# Patient Record
Sex: MALE | Race: WHITE | Employment: FULL TIME | URBAN - METROPOLITAN AREA
[De-identification: names, ages, dates, MRNs, and addresses within clinical notes are randomized per-mention and may not be internally consistent; named-entity substitution may affect disease eponyms.]

---

## 2021-02-11 ENCOUNTER — HOSPITAL ENCOUNTER (EMERGENCY)
Age: 46
Discharge: ANOTHER HOSPITAL | End: 2021-02-11
Attending: EMERGENCY MEDICINE
Payer: COMMERCIAL

## 2021-02-11 ENCOUNTER — APPOINTMENT (OUTPATIENT)
Dept: CT IMAGING | Age: 46
End: 2021-02-11
Payer: COMMERCIAL

## 2021-02-11 VITALS
WEIGHT: 155 LBS | RESPIRATION RATE: 16 BRPM | DIASTOLIC BLOOD PRESSURE: 87 MMHG | TEMPERATURE: 97.5 F | OXYGEN SATURATION: 98 % | BODY MASS INDEX: 23.49 KG/M2 | HEIGHT: 68 IN | SYSTOLIC BLOOD PRESSURE: 155 MMHG | HEART RATE: 71 BPM

## 2021-02-11 DIAGNOSIS — L03.211 FACIAL CELLULITIS: ICD-10-CM

## 2021-02-11 DIAGNOSIS — K05.219 ACUTE PERIODONTAL ABSCESS: Primary | ICD-10-CM

## 2021-02-11 LAB
ALBUMIN SERPL-MCNC: 4.9 GM/DL (ref 3.4–5)
ALP BLD-CCNC: 82 IU/L (ref 40–129)
ALT SERPL-CCNC: 14 U/L (ref 10–40)
ANION GAP SERPL CALCULATED.3IONS-SCNC: 10 MMOL/L (ref 4–16)
AST SERPL-CCNC: 16 IU/L (ref 15–37)
BASOPHILS ABSOLUTE: 0 K/CU MM
BASOPHILS RELATIVE PERCENT: 0.3 % (ref 0–1)
BILIRUB SERPL-MCNC: 0.3 MG/DL (ref 0–1)
BILIRUBIN DIRECT: 0.2 MG/DL (ref 0–0.3)
BILIRUBIN, INDIRECT: 0.1 MG/DL (ref 0–0.7)
BUN BLDV-MCNC: 9 MG/DL (ref 6–23)
CALCIUM SERPL-MCNC: 9.9 MG/DL (ref 8.3–10.6)
CHLORIDE BLD-SCNC: 101 MMOL/L (ref 99–110)
CO2: 27 MMOL/L (ref 21–32)
CREAT SERPL-MCNC: 0.8 MG/DL (ref 0.9–1.3)
DIFFERENTIAL TYPE: ABNORMAL
EOSINOPHILS ABSOLUTE: 0.2 K/CU MM
EOSINOPHILS RELATIVE PERCENT: 1.3 % (ref 0–3)
GFR AFRICAN AMERICAN: >60 ML/MIN/1.73M2
GFR NON-AFRICAN AMERICAN: >60 ML/MIN/1.73M2
GLUCOSE BLD-MCNC: 120 MG/DL (ref 70–99)
HCT VFR BLD CALC: 47.7 % (ref 42–52)
HEMOGLOBIN: 16.5 GM/DL (ref 13.5–18)
IMMATURE NEUTROPHIL %: 0.3 % (ref 0–0.43)
LACTATE: 1.2 MMOL/L (ref 0.4–2)
LYMPHOCYTES ABSOLUTE: 2.2 K/CU MM
LYMPHOCYTES RELATIVE PERCENT: 16.8 % (ref 24–44)
MCH RBC QN AUTO: 30.7 PG (ref 27–31)
MCHC RBC AUTO-ENTMCNC: 34.6 % (ref 32–36)
MCV RBC AUTO: 88.8 FL (ref 78–100)
MONOCYTES ABSOLUTE: 0.9 K/CU MM
MONOCYTES RELATIVE PERCENT: 6.7 % (ref 0–4)
PDW BLD-RTO: 12.1 % (ref 11.7–14.9)
PLATELET # BLD: 163 K/CU MM (ref 140–440)
PMV BLD AUTO: 11.3 FL (ref 7.5–11.1)
POTASSIUM SERPL-SCNC: 4 MMOL/L (ref 3.5–5.1)
RBC # BLD: 5.37 M/CU MM (ref 4.6–6.2)
SEGMENTED NEUTROPHILS ABSOLUTE COUNT: 10 K/CU MM
SEGMENTED NEUTROPHILS RELATIVE PERCENT: 74.6 % (ref 36–66)
SODIUM BLD-SCNC: 138 MMOL/L (ref 135–145)
TOTAL IMMATURE NEUTOROPHIL: 0.04 K/CU MM
TOTAL PROTEIN: 8.2 GM/DL (ref 6.4–8.2)
WBC # BLD: 13.3 K/CU MM (ref 4–10.5)

## 2021-02-11 PROCEDURE — 96365 THER/PROPH/DIAG IV INF INIT: CPT | Performed by: EMERGENCY MEDICINE

## 2021-02-11 PROCEDURE — 80053 COMPREHEN METABOLIC PANEL: CPT

## 2021-02-11 PROCEDURE — 85025 COMPLETE CBC W/AUTO DIFF WBC: CPT

## 2021-02-11 PROCEDURE — 6360000002 HC RX W HCPCS: Performed by: EMERGENCY MEDICINE

## 2021-02-11 PROCEDURE — 82248 BILIRUBIN DIRECT: CPT

## 2021-02-11 PROCEDURE — 96375 TX/PRO/DX INJ NEW DRUG ADDON: CPT | Performed by: EMERGENCY MEDICINE

## 2021-02-11 PROCEDURE — 83605 ASSAY OF LACTIC ACID: CPT

## 2021-02-11 PROCEDURE — 2580000003 HC RX 258: Performed by: EMERGENCY MEDICINE

## 2021-02-11 PROCEDURE — 6360000004 HC RX CONTRAST MEDICATION: Performed by: EMERGENCY MEDICINE

## 2021-02-11 PROCEDURE — 6370000000 HC RX 637 (ALT 250 FOR IP): Performed by: EMERGENCY MEDICINE

## 2021-02-11 PROCEDURE — 70487 CT MAXILLOFACIAL W/DYE: CPT

## 2021-02-11 PROCEDURE — 96367 TX/PROPH/DG ADDL SEQ IV INF: CPT | Performed by: EMERGENCY MEDICINE

## 2021-02-11 PROCEDURE — 99283 EMERGENCY DEPT VISIT LOW MDM: CPT | Performed by: EMERGENCY MEDICINE

## 2021-02-11 RX ORDER — DEXAMETHASONE SODIUM PHOSPHATE 4 MG/ML
10 INJECTION, SOLUTION INTRA-ARTICULAR; INTRALESIONAL; INTRAMUSCULAR; INTRAVENOUS; SOFT TISSUE ONCE
Status: COMPLETED | OUTPATIENT
Start: 2021-02-11 | End: 2021-02-11

## 2021-02-11 RX ORDER — NICOTINE 21 MG/24HR
1 PATCH, TRANSDERMAL 24 HOURS TRANSDERMAL DAILY
Status: DISCONTINUED | OUTPATIENT
Start: 2021-02-11 | End: 2021-02-11 | Stop reason: HOSPADM

## 2021-02-11 RX ORDER — SODIUM CHLORIDE 0.9 % (FLUSH) 0.9 %
10 SYRINGE (ML) INJECTION PRN
Status: DISCONTINUED | OUTPATIENT
Start: 2021-02-11 | End: 2021-02-11 | Stop reason: HOSPADM

## 2021-02-11 RX ORDER — ACETAMINOPHEN 500 MG
1000 TABLET ORAL EVERY 8 HOURS PRN
Status: DISCONTINUED | OUTPATIENT
Start: 2021-02-11 | End: 2021-02-11 | Stop reason: HOSPADM

## 2021-02-11 RX ORDER — SODIUM CHLORIDE 9 MG/ML
INJECTION, SOLUTION INTRAVENOUS CONTINUOUS
Status: DISCONTINUED | OUTPATIENT
Start: 2021-02-11 | End: 2021-02-11 | Stop reason: HOSPADM

## 2021-02-11 RX ORDER — FENTANYL CITRATE 50 UG/ML
50 INJECTION, SOLUTION INTRAMUSCULAR; INTRAVENOUS EVERY 4 HOURS PRN
Status: DISCONTINUED | OUTPATIENT
Start: 2021-02-11 | End: 2021-02-11 | Stop reason: HOSPADM

## 2021-02-11 RX ORDER — 0.9 % SODIUM CHLORIDE 0.9 %
1000 INTRAVENOUS SOLUTION INTRAVENOUS ONCE
Status: COMPLETED | OUTPATIENT
Start: 2021-02-11 | End: 2021-02-11

## 2021-02-11 RX ADMIN — VANCOMYCIN HYDROCHLORIDE 1000 MG: 1 INJECTION, POWDER, LYOPHILIZED, FOR SOLUTION INTRAVENOUS at 02:08

## 2021-02-11 RX ADMIN — SODIUM CHLORIDE 1000 ML: 9 INJECTION, SOLUTION INTRAVENOUS at 01:17

## 2021-02-11 RX ADMIN — IOPAMIDOL 75 ML: 755 INJECTION, SOLUTION INTRAVENOUS at 01:41

## 2021-02-11 RX ADMIN — CEFTRIAXONE 2000 MG: 1 INJECTION, POWDER, FOR SOLUTION INTRAMUSCULAR; INTRAVENOUS at 01:17

## 2021-02-11 RX ADMIN — SODIUM CHLORIDE, PRESERVATIVE FREE 10 ML: 5 INJECTION INTRAVENOUS at 01:42

## 2021-02-11 RX ADMIN — DEXAMETHASONE SODIUM PHOSPHATE 10 MG: 4 INJECTION INTRA-ARTICULAR; INTRALESIONAL; INTRAMUSCULAR; INTRAVENOUS; SOFT TISSUE at 01:17

## 2021-02-11 ASSESSMENT — PAIN DESCRIPTION - ONSET: ONSET: PROGRESSIVE

## 2021-02-11 ASSESSMENT — PAIN DESCRIPTION - PAIN TYPE: TYPE: ACUTE PAIN

## 2021-02-11 ASSESSMENT — PAIN DESCRIPTION - FREQUENCY: FREQUENCY: CONTINUOUS

## 2021-02-11 NOTE — ED PROVIDER NOTES
Emergency Department Encounter    Patient: Gurpreet Smart  MRN: [id number]  : 1975  Date of Evaluation: 2021  ED Provider:  Hermine Dancer    Triage Chief Complaint:   Facial Swelling (dental abscess causing facial swelling )    [place]:  Gurpreet Smart is a 50 y.o. male that presents with left facial swelling. Patient reports he woke up with swelling earlier today. No fever or chills. Patient reports he has poor dentition and is concerned for a dental abscess. Patient reports continuous throbbing pain 6 out of 10. Pain is exacerbated by chewing. Patient reports he is unaware of any alleviating factors. Pain radiates from his left upper jaw to his left cheek/infraorbital area. No tongue swelling, throat swelling, difficulty breathing, difficulty swallowing or difficulty opening his mouth. ROS - see HPI, below listed is current ROS at time of my eval:  General:  No fevers  Eyes:  no discharge  ENT:  No sore throat, no nasal congestion, no hearing changes, + dental pain  Cardiovascular:  No chest pain  Respiratory:  no cough  Gastrointestinal:  no nausea, no vomiting  Musculoskeletal:   no joint pain  Skin:  No rash  Neurologic:  No speech problems, no headache  Genitourinary:  No dysuria    Past Medical History:   Diagnosis Date    Broken bones     collar bone left sude and left hand    Bronchitis     Pneumonia     Smoker      History reviewed. No pertinent surgical history.   Family History   Problem Relation Age of Onset    High Blood Pressure Mother     Other Mother     Cancer Father     High Blood Pressure Father     Other Father     Cancer Maternal Grandmother      Social History     Socioeconomic History    Marital status: Single     Spouse name: Not on file    Number of children: Not on file    Years of education: Not on file    Highest education level: Not on file   Occupational History    Not on file   Social Needs    Financial resource strain: Not on file   Darlene-Kay insecurity     Worry: Not on file     Inability: Not on file    Transportation needs     Medical: Not on file     Non-medical: Not on file   Tobacco Use    Smoking status: Current Every Day Smoker     Packs/day: 1.00     Years: 23.00     Pack years: 23.00     Types: Cigarettes     Start date: 5/29/1991    Smokeless tobacco: Former User    Tobacco comment: chantix given   Substance and Sexual Activity    Alcohol use: No    Drug use: Yes     Types: Marijuana    Sexual activity: Not on file   Lifestyle    Physical activity     Days per week: Not on file     Minutes per session: Not on file    Stress: Not on file   Relationships    Social connections     Talks on phone: Not on file     Gets together: Not on file     Attends Religion service: Not on file     Active member of club or organization: Not on file     Attends meetings of clubs or organizations: Not on file     Relationship status: Not on file    Intimate partner violence     Fear of current or ex partner: Not on file     Emotionally abused: Not on file     Physically abused: Not on file     Forced sexual activity: Not on file   Other Topics Concern    Not on file   Social History Narrative    Not on file     Current Facility-Administered Medications   Medication Dose Route Frequency Provider Last Rate Last Admin    vancomycin 1000 mg IVPB in 250 mL D5W addavial  1,000 mg Intravenous Once Ofelia Trevino  mL/hr at 02/11/21 0208 1,000 mg at 02/11/21 0208    sodium chloride flush 0.9 % injection 10 mL  10 mL Intravenous PRN Ofelia Trevino MD   10 mL at 02/11/21 0142     Current Outpatient Medications   Medication Sig Dispense Refill    nicotine (NICODERM CQ) 21 MG/24HR Place 1 patch onto the skin every 24 hours 42 patch 0    albuterol (PROVENTIL HFA) 108 (90 BASE) MCG/ACT inhaler Inhale 2 puffs into the lungs every 4 hours as needed for Wheezing or Shortness of Breath With spacer (and mask if indicated). Thanks.  1 Inhaler 1    acetaminophen-codeine (TYLENOL/CODEINE #3) 300-30 MG per tablet Take 1 tablet by mouth every 4 hours as needed for Pain. 20 tablet 0    aspirin 81 MG tablet Take 81 mg by mouth daily. Allergies   Allergen Reactions    Clindamycin/Lincomycin     Penicillins Other (See Comments)     Unknown - allergy as child       Nursing Notes Reviewed    Physical Exam:  Triage VS:    ED Triage Vitals [02/11/21 0059]   Enc Vitals Group      BP (!) 159/92      Pulse 70      Resp 16      Temp 97.5 °F (36.4 °C)      Temp src       SpO2 99 %      Weight 155 lb (70.3 kg)      Height 5' 8\" (1.727 m)      Head Circumference       Peak Flow       Pain Score       Pain Loc       Pain Edu? Excl. in [address]? General appearance:  No acute distress. Skin:  Warm. Dry. Eye:  Extraocular movements intact. Ears, nose, mouth and throat:  Oral mucosa moist, no edema under the tongue, posterior pharynx without erythema, exudate or edema,diffuse gumline erythema with diffuse dental decay. Erythema and edema worse along the left upper dentition especially around his upper canine, no obvious abscess on visualization or palpation, diffuse dental caries and decay noted  Neck:  Trachea midline. No tender palpable cervical lymphadenopathy  Extremity:  Normal ROM     Respiratory:  Respirations nonlabored. Neurological:  Alert and oriented    Labs Reviewed   CBC WITH AUTO DIFFERENTIAL - Abnormal; Notable for the following components:       Result Value    WBC 13.3 (*)     MPV 11.3 (*)     Segs Relative 74.6 (*)     Lymphocytes % 16.8 (*)     Monocytes % 6.7 (*)     All other components within normal limits   BASIC METABOLIC PANEL W/ REFLEX TO MG FOR LOW K - Abnormal; Notable for the following components:    CREATININE 0.8 (*)     Glucose 120 (*)     All other components within normal limits   HEPATIC FUNCTION PANEL   LACTIC ACID, PLASMA         CT FACIAL BONES W CONTRAST   Final Result   1.  Extensive superior and inferior multifocal periodontal disease with   multifocal areas of cortical breakthrough. 2. Associated circumferential periodontal abscess at the external, posterior   and internal aspect of the left superior canine tooth. 3. Overlying diffuse left facial and anterior left neck diffuse soft tissue   cellulitis, as discussed above. MDM:  Patient presenting for dental pain and facial swelling which is most likely secondary to tooth decay/dental caries. Exam as above. No signs of Anamaria's angina. We will get CBC, CMP, lactic acid    Patient was given ceftriaxone 2 g IV x1, vancomycin 1 g IV x1, dexamethasone 10 mg IV x1. Laboratory also showed mild leukocytosis 13.3    CT shows extensive superior and inferior multifocal periodontal disease with multifocal areas of cortical breakthrough along with circumferential periodontal abscess at the external, posterior and internal aspects of the left superior canine. It also shows overlying diffuse left facial and anterior left neck cellulitis. I discussed the patient's work-up findings with him and great length as well as need for admission. On my exam I do not see an obvious drainable abscess In the emergency department. Patient will most likely need oral maxillofacial surgery evaluation. [facility] transfer center was contacted. I discussed the work-up, findings, need for admission with hospitalist at [facility] who was agreeable to admission. Clinical Impression:  1. Acute periodontal abscess    2. Facial cellulitis      Disposition referral (if applicable):  No follow-up provider specified.   Disposition medications (if applicable):  New Prescriptions    No medications on file     ED Provider Disposition Time  DISPOSITION Decision To Transfer 02/11/2021 02:35:57 AM      Comment: Please note this report has been produced using speech recognition software and may contain errors related to that system including errors in grammar,

## 2021-09-28 ENCOUNTER — OFFICE VISIT (OUTPATIENT)
Dept: FAMILY MEDICINE CLINIC | Age: 46
End: 2021-09-28
Payer: COMMERCIAL

## 2021-09-28 ENCOUNTER — HOSPITAL ENCOUNTER (OUTPATIENT)
Age: 46
Setting detail: SPECIMEN
Discharge: HOME | End: 2021-09-28
Payer: COMMERCIAL

## 2021-09-28 VITALS
TEMPERATURE: 98.1 F | HEART RATE: 60 BPM | OXYGEN SATURATION: 98 % | RESPIRATION RATE: 12 BRPM | HEIGHT: 68 IN | WEIGHT: 145 LBS | BODY MASS INDEX: 21.98 KG/M2

## 2021-09-28 DIAGNOSIS — J06.9 VIRAL URI WITH COUGH: Primary | ICD-10-CM

## 2021-09-28 DIAGNOSIS — Z20.822 CLOSE EXPOSURE TO COVID-19 VIRUS: ICD-10-CM

## 2021-09-28 PROCEDURE — 99213 OFFICE O/P EST LOW 20 MIN: CPT | Performed by: PHYSICIAN ASSISTANT

## 2021-09-28 PROCEDURE — U0003 INFECTIOUS AGENT DETECTION BY NUCLEIC ACID (DNA OR RNA); SEVERE ACUTE RESPIRATORY SYNDROME CORONAVIRUS 2 (SARS-COV-2) (CORONAVIRUS DISEASE [COVID-19]), AMPLIFIED PROBE TECHNIQUE, MAKING USE OF HIGH THROUGHPUT TECHNOLOGIES AS DESCRIBED BY CMS-2020-01-R: HCPCS

## 2021-09-28 PROCEDURE — U0005 INFEC AGEN DETEC AMPLI PROBE: HCPCS

## 2021-09-28 NOTE — PROGRESS NOTES
9/28/21  Una Caba  1975    FLU/COVID-19 CLINIC EVALUATION    HPI SYMPTOMS:    Michelet Breed    [] Fevers  [x] Chills  [x] Cough  [] Coughing up blood  [x] Chest Congestion  [x] Nasal Congestion  [x] Feeling short of breath  [] Sometimes  [x] Frequently  [] All the time  [x] Chest pain  [x] Headaches  [x]Tolerable  [] Severe  [x] Sore throat  [x] Muscle aches  [] Nausea  [] Vomiting  []Unable to keep fluids down  [x] Diarrhea  []Severe    [x] OTHER SYMPTOMS:    Fatigue  Numbness&joint pain    Symptom Duration:   [] 1  [] 2   [] 3   [] 4    [] 5   [] 6   [x] 7   [] 8   [] 9   [] 10   [] 11   [] 12   [] 13   [] 14   [] Longer than 14 days    Symptom course:   [] Worsening     [x] Stable     [] Improving    RISK FACTORS:    [] Pregnant or possibly pregnant  [] Age over 61  [] Diabetes  [] Heart disease  [] Asthma  [] COPD/Other chronic lung diseases  [] Active Cancer  [] On Chemotherapy  [] Taking oral steroids  [] History Lymphoma/Leukemia  [x] Close contact with a lab confirmed COVID-19 patient within 14 days of symptom onset-coworkers&in-law  [] History of travel from affected geographical areas within 14 days of symptom onset       VITALS:  There were no vitals filed for this visit. TESTS:    POCT FLU:  [] Positive     []Negative    ASSESSMENT:    [] Flu  [] Possible COVID-19  [] Strep    PLAN:    [] Discharge home with written instructions for:  [] Flu management  [] Possible COVID-19 infection with self-quarantine and management of symptoms  [] Follow-up with primary care physician or emergency department if worsens  [] Evaluation per physician/NP/PA in clinic  [] Sent to ER       An  electronic signature was used to authenticate this note.      --Yifan Thompson on 9/28/2021 at 9:51 AM

## 2021-09-28 NOTE — PROGRESS NOTES
9/28/2021    HPI:  Chief complaint and history of present illness as per medical assistant/nurse documented today in the Flu/COVID-19 clinic. Patient presents to the clinic today with 7-day history of chills, cough, chest and nasal congestion, intermittent headache, sore throat, myalgias. He states that he had been experiencing chest pain/tightness/heaviness and shortness of breath but that has resolved. He had also been experiencing diarrhea but that has also resolved. He denies hemoptysis, weight loss or weight gain, weakness, or fevers. He denies abdominal pain, nausea, vomiting, bloody or black stools, loss of taste or smell. He denies palpitations, lightheadedness, dizziness, vision changes. He has taken Aleve as needed. He has been in close contact with many ill individuals, quite a few who currently have COVID-19. He is employed by [employer] in [place] MEDICATIONS:  Prior to Visit Medications    Medication Sig Taking? Authorizing Provider   nicotine (NICODERM CQ) 21 MG/24HR Place 1 patch onto the skin every 24 hours  Radha Shaver MD   albuterol (PROVENTIL HFA) 108 (90 BASE) MCG/ACT inhaler Inhale 2 puffs into the lungs every 4 hours as needed for Wheezing or Shortness of Breath With spacer (and mask if indicated). Thanks. Tal Lopez DO   acetaminophen-codeine (TYLENOL/CODEINE #3) 300-30 MG per tablet Take 1 tablet by mouth every 4 hours as needed for Pain. Katherina Kocher, PA-C   aspirin 81 MG tablet Take 81 mg by mouth daily.   Historical Provider, MD       Allergies   Allergen Reactions    Clindamycin/Lincomycin     Penicillins Other (See Comments)     Unknown - allergy as child   ,   Past Medical History:   Diagnosis Date    Broken bones     collar bone left sude and left hand    Bronchitis     Pneumonia     Smoker        PHYSICAL EXAM:  Physical Exam  Temp:  98.1 F  Heart Rate:  60    Pulse Ox:  98%    Constitutional:  Well developed, well nourished  HENT: Normocephalic, atraumatic, bilateral external ears normal, bilateral ear canals normal, bilateral TMs normal, oropharynx moist, postnasal drip noted, uvula midline and benign and airway patent. Nasal mucosa congested. Eyes:  conjunctiva normal, no discharge, no scleral icterus  Cardiovascular:  Normal heart rate, normal rhythm, no murmurs, gallops or rubs  Thorax & Lungs:  Normal breath sounds, no respiratory distress, no wheezing, no rales, no rhonchi  LE: No erythema, edema, tenderness or palpable cord  Skin:  Warm, dry, no erythema, no rash  Neurologic:  Alert & oriented   Psychiatric:  Affect normal, mood normal    ASSESSMENT/PLAN:  1. Viral URI with cough  - Covid-19 Ambulatory    2. Close exposure to COVID-19 virus  - Covid-19 Ambulatory    Your COVID 19 test can take 1-5 days for the results to come back. We ask that you make a Mychart page and view your test results this way. You will need to Self quarantine until you know your results. Increase fluids and rest  Saline nasal spray as needed for nasal congestion  Warm salt gargles as needed for throat discomfort  Monitor temperature twice a day  Tylenol as needed for fevers and/or discomfort. Big deep breaths periodically throughout the day  Regular Mucinex over the counter as needed for chest congestion  Smoking cessation encouraged  If symptoms worsen -Go to the ER. Follow up with your primary care provider    FOLLOW-UP:  No follow-ups on file.     In addition to other information, the printed after visit summary provided to the patient includes:  [x] COVID-19 Self care instructions  [x] COVID-19 General patient information    Stephanie Bueno PA-C

## 2021-09-28 NOTE — PATIENT INSTRUCTIONS
Your COVID 19 test can take 1-5 days for the results to come back. We ask that you make a Mychart page and view your test results this way. You will need to Self quarantine until you know your results. Increase fluids and rest  Saline nasal spray as needed for nasal congestion  Warm salt gargles as needed for throat discomfort  Monitor temperature twice a day  Tylenol as needed for fevers and/or discomfort. Big deep breaths periodically throughout the day  Regular Mucinex over the counter as needed for chest congestion  If symptoms worsen -Go to the ER. Follow up with your primary care provider      To Whom it May Concern:    Maryann Shafer was tested for COVID-19 9/28/2021. He/she must stay home until test results are back. If test is positive, he/she must quarantine for a total of 10 days starting from day one of symptom onset. He/she must also be fever-free for 24 hours at that time, and also have improvement in symptoms. We do not recommend retesting as patients may continue to test positive for months even though no longer contagious. It is suggested you call [address] or  [place] with any questions regarding quarantine timeframe/return to work/school details.

## 2021-09-29 LAB
SARS-COV-2: NOT DETECTED
SOURCE: NORMAL

## 2022-02-10 ENCOUNTER — HOSPITAL ENCOUNTER (OUTPATIENT)
Age: 47
Setting detail: SPECIMEN
Discharge: HOME | End: 2022-02-10
Payer: COMMERCIAL

## 2022-02-10 ENCOUNTER — OFFICE VISIT (OUTPATIENT)
Dept: FAMILY MEDICINE CLINIC | Age: 47
End: 2022-02-10
Payer: COMMERCIAL

## 2022-02-10 VITALS
TEMPERATURE: 98.1 F | OXYGEN SATURATION: 97 % | WEIGHT: 159 LBS | HEART RATE: 94 BPM | RESPIRATION RATE: 12 BRPM | BODY MASS INDEX: 24.18 KG/M2

## 2022-02-10 DIAGNOSIS — J06.9 VIRAL URI WITH COUGH: Primary | ICD-10-CM

## 2022-02-10 PROCEDURE — U0003 INFECTIOUS AGENT DETECTION BY NUCLEIC ACID (DNA OR RNA); SEVERE ACUTE RESPIRATORY SYNDROME CORONAVIRUS 2 (SARS-COV-2) (CORONAVIRUS DISEASE [COVID-19]), AMPLIFIED PROBE TECHNIQUE, MAKING USE OF HIGH THROUGHPUT TECHNOLOGIES AS DESCRIBED BY CMS-2020-01-R: HCPCS

## 2022-02-10 PROCEDURE — 99213 OFFICE O/P EST LOW 20 MIN: CPT | Performed by: PHYSICIAN ASSISTANT

## 2022-02-10 PROCEDURE — U0005 INFEC AGEN DETEC AMPLI PROBE: HCPCS

## 2022-02-10 NOTE — PROGRESS NOTES
2/10/22  Ynr Roberson  1975    FLU/COVID-19 CLINIC EVALUATION    HPI SYMPTOMS:    Danika Veronika    [x] Fevers  [] Chills  [x] Cough  [] Coughing up blood  [x] Chest Congestion  [x] Nasal Congestion  [x] Feeling short of breath  [x] Sometimes  [] Frequently  [] All the time  [x] Chest pain  [x] Headaches  [x]Tolerable  [x] Severe  [x] Sore throat  [x] Muscle aches  [x] Nausea  [] Vomiting  []Unable to keep fluids down  [] Diarrhea  []Severe    [x] OTHER SYMPTOMS:    Fatigue    Symptom Duration:   [x] 1  [] 2   [] 3   [] 4    [] 5   [] 6   [] 7   [] 8   [] 9   [] 10   [] 11   [] 12   [] 13   [] 14   [] Longer than 14 days    Symptom course:   [] Worsening     [x] Stable     [] Improving    RISK FACTORS:    [] Pregnant or possibly pregnant  [] Age over 59  [] Diabetes  [] Heart disease  [] Asthma  [] COPD/Other chronic lung diseases  [] Active Cancer  [] On Chemotherapy  [] Taking oral steroids  [] History Lymphoma/Leukemia  [] Close contact with a lab confirmed COVID-19 patient within 14 days of symptom onset  [] History of travel from affected geographical areas within 14 days of symptom onset       VITALS:  There were no vitals filed for this visit. TESTS:    POCT FLU:  [] Positive     []Negative    ASSESSMENT:    [] Flu  [] Possible COVID-19  [] Strep    PLAN:    [] Discharge home with written instructions for:  [] Flu management  [] Possible COVID-19 infection with self-quarantine and management of symptoms  [] Follow-up with primary care physician or emergency department if worsens  [] Evaluation per physician/NP/PA in clinic  [] Sent to ER       An  electronic signature was used to authenticate this note.      --Belinda Garrison on 2/10/2022 at 6:14 PM

## 2022-02-10 NOTE — PROGRESS NOTES
2/10/2022    HPI:  Chief complaint and history of present illness as per medical assistant/nurse documented today in the Flu/COVID-19 clinic. Patient presents to the clinic today with 24-hour history of fever, cough, chest and nasal congestion, chest tightness, shortness of breath, headaches, sore throat, myalgia, nausea, fatigue. He states symptoms are overall mild and stable. He denies abdominal pain, vomiting, diarrhea, loss of taste or smell. He denies wheezing or hemoptysis. He has been taking Aleve as needed. He reports good p.o. intake and urine output. He is employed by [employer] [address]. Many ill coworkers. He is not vaccinated against COVID-19. MEDICATIONS:  Prior to Visit Medications    Medication Sig Taking? Authorizing Provider   nicotine (NICODERM CQ) 21 MG/24HR Place 1 patch onto the skin every 24 hours  Kumar Zayas MD   albuterol (PROVENTIL HFA) 108 (90 BASE) MCG/ACT inhaler Inhale 2 puffs into the lungs every 4 hours as needed for Wheezing or Shortness of Breath With spacer (and mask if indicated). Thanks. George Beltran,    acetaminophen-codeine (TYLENOL/CODEINE #3) 300-30 MG per tablet Take 1 tablet by mouth every 4 hours as needed for Pain. Angella Marcos PA-C   aspirin 81 MG tablet Take 81 mg by mouth daily.   Historical Provider, MD       Allergies   Allergen Reactions    Clindamycin/Lincomycin     Penicillins Other (See Comments)     Unknown - allergy as child   ,   Past Medical History:   Diagnosis Date    Broken bones     collar bone left sude and left hand    Bronchitis     Pneumonia     Smoker        PHYSICAL EXAM:  Physical Exam    Vitals:    02/10/22 1839   Pulse: 94   Resp: 12   Temp: 98.1 °F (36.7 °C)   SpO2: 97%         Constitutional:  Well developed, well nourished  HENT:  Normocephalic, atraumatic, bilateral external ears normal, bilateral ear canals normal, bilateral TMs normal, oropharynx moist, nose normal  Eyes:  conjunctiva normal, no discharge, no scleral icterus  Cardiovascular:  Normal heart rate, normal rhythm, no murmurs, gallops or rubs  Thorax & Lungs:  Normal breath sounds, no respiratory distress, no wheezing, no rales, no rhonchi  Skin:  Warm, dry, no erythema, no rash  Neurologic:  Alert & oriented   Psychiatric:  Affect normal, mood normal    ASSESSMENT/PLAN:  1. Viral URI with cough  - Covid-19 Ambulatory      COVID-19 test results pending  Offered Zofran, patient declined  Increase fluids and rest  [place] diet and advance as tolerated  Saline nasal spray as needed for nasal congestion  Warm salt gargles as needed for throat discomfort  Monitor temperature twice a day  Tylenol as needed for fevers and/or discomfort. Big deep breaths periodically throughout the day  Regular Mucinex over the counter as needed for chest congestion  Smoking cessation encouraged  If symptoms worsen -Go to the ER. Follow up with your primary care provider    I did don appropriate PPE (including N95 face mask, protective safety glasses, face shield, gloves, and gown) as recommended by the health facility/national standard best practice, during my interaction with the patient. FOLLOW-UP:  No follow-ups on file.       Raul Faye PA-C

## 2022-02-10 NOTE — PATIENT INSTRUCTIONS
Your COVID 19 test can take 1-5 days for the results to come back. We ask that you make a Mychart page and view your test results this way. You will need to Self quarantine until you know your results. If positive, please work on contact tracing. Increase fluids and rest  Duff diet and advance as tolerated  Saline nasal spray as needed for nasal congestion  Warm salt gargles as needed for throat discomfort  Monitor temperature twice a day  Tylenol as needed for fevers and/or discomfort. Big deep breaths periodically throughout the day  Regular Mucinex over the counter as needed for chest congestion  If symptoms worsen -Go to the ER. Follow up with your primary care provider      To Whom it May Concern:    Yoanna Blackwell was tested for COVID-19 2/10/2022. He/she must stay home until test results are back. If test is positive, isolate for a total of 5 days, starting from day 1 of symptom onset. After 5 days, if fever-free for 24 hours and there has been a gradual improvement in symptoms, may come out of isolation, but must consistently wear a mask when around other people for 5 additional days. (5 days isolation, 5 days mask-wearing). We do not recommend retesting as patients may continue to test positive for months even though no longer contagious. It is suggested you call [address] [facility] or [address] [place] [address] with any questions regarding isolation timeframe/return to work/school details. Jennifer Villalta PA-C      Patient Education        Viral Respiratory Infection: Care Instructions  Your Care Instructions     Viruses are very small organisms. They grow in number after they enter your body. There are many types that cause different illnesses, such as colds and the mumps. The symptoms of a viral respiratory infection often start quickly. They include a fever, sore throat, and runny nose. You may also just not feel well. Or you may not want to eat much.   Most viral respiratory infections are not serious. They usually get better with time and self-care. Antibiotics are not used to treat a viral infection. That's because antibiotics will not help cure a viral illness. In some cases, antiviral medicine can help your body fight a serious viral infection. Follow-up care is a key part of your treatment and safety. Be sure to make and go to all appointments, and call your doctor if you are having problems. It's also a good idea to know your test results and keep a list of the medicines you take. How can you care for yourself at home? · Rest as much as possible until you feel better. · Be safe with medicines. Take your medicine exactly as prescribed. Call your doctor if you think you are having a problem with your medicine. You will get more details on the specific medicine your doctor prescribes. · Take an over-the-counter pain medicine, such as acetaminophen (Tylenol), ibuprofen (Advil, Motrin), or naproxen (Aleve), as needed for pain and fever. Read and follow all instructions on the label. Do not give aspirin to anyone younger than 20. It has been linked to Reye syndrome, a serious illness. · Drink plenty of fluids. Hot fluids, such as tea or soup, may help relieve congestion in your nose and throat. If you have kidney, heart, or liver disease and have to limit fluids, talk with your doctor before you increase the amount of fluids you drink. · Try to clear mucus from your lungs by breathing deeply and coughing. · Gargle with warm salt water once an hour. This can help reduce swelling and throat pain. Use 1 teaspoon of salt mixed in 1 cup of warm water. · Do not smoke or allow others to smoke around you. If you need help quitting, talk to your doctor about stop-smoking programs and medicines. These can increase your chances of quitting for good. To avoid spreading the virus  · Cough or sneeze into a tissue. Then throw the tissue away.   · If you don't have a tissue, use your hand to cover your cough or sneeze. Then clean your hand. You can also cough into your sleeve. · Wash your hands often. Use soap and warm water. Wash for 15 to 20 seconds each time. · If you don't have soap and water near you, you can clean your hands with alcohol wipes or gel. When should you call for help? Call your doctor now or seek immediate medical care if:    · You have a new or higher fever.     · Your fever lasts more than 48 hours.     · You have trouble breathing.     · You have a fever with a stiff neck or a severe headache.     · You are sensitive to light.     · You feel very sleepy or confused. Watch closely for changes in your health, and be sure to contact your doctor if:    · You do not get better as expected. Where can you learn more? Go to https://[email or web address] org and sign in to your [employer] account. Enter A624 in the [employer] box to learn more about \"Viral Respiratory Infection: Care Instructions. \"     If you do not have an account, please click on the \"Sign Up Now\" link. Current as of: July 6, 2021               Content Version: 13.1  © 1912-5157 Healthwise, [email or web address]. Care instructions adapted under license by [facility] ([facility]). If you have questions about a medical condition or this instruction, always ask your healthcare professional. [address] any warranty or liability for your use of this information.

## 2022-02-11 LAB
SARS-COV-2: DETECTED
SOURCE: ABNORMAL

## 2022-05-06 ENCOUNTER — OFFICE VISIT (OUTPATIENT)
Dept: FAMILY MEDICINE CLINIC | Age: 47
End: 2022-05-06
Payer: COMMERCIAL

## 2022-05-06 VITALS
OXYGEN SATURATION: 97 % | SYSTOLIC BLOOD PRESSURE: 128 MMHG | TEMPERATURE: 99.1 F | WEIGHT: 163 LBS | BODY MASS INDEX: 24.78 KG/M2 | DIASTOLIC BLOOD PRESSURE: 82 MMHG | HEART RATE: 90 BPM

## 2022-05-06 DIAGNOSIS — R22.0 RIGHT FACIAL SWELLING: ICD-10-CM

## 2022-05-06 DIAGNOSIS — K08.89 PAIN, DENTAL: Primary | ICD-10-CM

## 2022-05-06 PROCEDURE — 99213 OFFICE O/P EST LOW 20 MIN: CPT | Performed by: PHYSICIAN ASSISTANT

## 2022-05-06 RX ORDER — METRONIDAZOLE 500 MG/1
500 TABLET ORAL 3 TIMES DAILY
Qty: 30 TABLET | Refills: 0 | Status: SHIPPED | OUTPATIENT
Start: 2022-05-06 | End: 2022-05-16

## 2022-05-06 RX ORDER — LEVOFLOXACIN 500 MG/1
500 TABLET, FILM COATED ORAL DAILY
Qty: 10 TABLET | Refills: 0 | Status: SHIPPED | OUTPATIENT
Start: 2022-05-06 | End: 2022-05-16

## 2022-05-06 NOTE — PROGRESS NOTES
5/6/2022    Gina Lazar    Chief Complaint   Patient presents with    Dental Pain       HPI  History was obtained from patient. Queen Kelley is a 48 y.o. male who presents today with concerns for right upper dental pain and right facial swelling. He states that he has \"terrible teeth and needs dental surgery but cannot afford it. \"  He woke up this morning with the right upper dental pain and facial swelling. Facial swelling continues to worsen. He denies skin redness. He denies foul taste in his mouth. He denies any visible dental abscesses. He denies nausea, vomiting, fever, chills, tachycardia. He had a periodontal abscess and facial cellulitis in February 2021 that required admission at [place].  He states that he had 3 teeth extracted at that time.     PAST MEDICAL HISTORY  Past Medical History:   Diagnosis Date    Broken bones     collar bone left sude and left hand    Bronchitis     Pneumonia     Smoker        FAMILY HISTORY  Family History   Problem Relation Age of Onset    High Blood Pressure Mother     Other Mother     Cancer Father     High Blood Pressure Father     Other Father     Cancer Maternal Grandmother        SOCIAL HISTORY  Social History     Socioeconomic History    Marital status: Single     Spouse name: None    Number of children: None    Years of education: None    Highest education level: None   Occupational History    None   Tobacco Use    Smoking status: Current Every Day Smoker     Packs/day: 1.00     Years: 23.00     Pack years: 23.00     Types: Cigarettes     Start date: 5/29/1991    Smokeless tobacco: Former User    Tobacco comment: chantix given   Vaping Use    Vaping Use: Never used   Substance and Sexual Activity    Alcohol use: No    Drug use: Not Currently     Types: Marijuana Chaimn Jose E)    Sexual activity: None   Other Topics Concern    None   Social History Narrative    None     Social Determinants of Health     Financial Resource Strain:     Difficulty of Paying Living Expenses: Not on file   Food Insecurity:     Worried About Running Out of Food in the Last Year: Not on file    Maci of Food in the Last Year: Not on file   Transportation Needs:     Lack of Transportation (Medical): Not on file    Lack of Transportation (Non-Medical): Not on file   Physical Activity:     Days of Exercise per Week: Not on file    Minutes of Exercise per Session: Not on file   Stress:     Feeling of Stress : Not on file   Social Connections:     Frequency of Communication with Friends and Family: Not on file    Frequency of Social Gatherings with Friends and Family: Not on file    Attends Jewish Services: Not on file    Active Member of [address] [place] [address] [employer] or Organizations: Not on file    Attends Club or Organization Meetings: Not on file    Marital Status: Not on file   Intimate Partner Violence:     Fear of Current or Ex-Partner: Not on file    Emotionally Abused: Not on file    Physically Abused: Not on file    Sexually Abused: Not on file   Housing Stability:     Unable to Pay for Housing in the Last Year: Not on file    Number of [place] in the Last Year: Not on file    Unstable Housing in the Last Year: Not on file        SURGICAL HISTORY  History reviewed. No pertinent surgical history. CURRENT MEDICATIONS  Current Outpatient Medications   Medication Sig Dispense Refill    levoFLOXacin (LEVAQUIN) 500 MG tablet Take 1 tablet by mouth daily for 10 days 10 tablet 0    metroNIDAZOLE (FLAGYL) 500 MG tablet Take 1 tablet by mouth 3 times daily for 10 days 30 tablet 0    nicotine (NICODERM CQ) 21 MG/24HR Place 1 patch onto the skin every 24 hours 42 patch 0    albuterol (PROVENTIL HFA) 108 (90 BASE) MCG/ACT inhaler Inhale 2 puffs into the lungs every 4 hours as needed for Wheezing or Shortness of Breath With spacer (and mask if indicated). Thanks.  1 Inhaler 1    acetaminophen-codeine (TYLENOL/CODEINE #3) 300-30 MG per tablet Take 1 tablet by mouth every 4 hours as needed for Pain. (Patient not taking: Reported on 5/6/2022) 20 tablet 0    aspirin 81 MG tablet Take 81 mg by mouth daily. (Patient not taking: Reported on 5/6/2022)       No current facility-administered medications for this visit. ALLERGIES  Allergies   Allergen Reactions    Clindamycin/Lincomycin     Penicillins Other (See Comments)     Unknown - allergy as child       PHYSICAL EXAM    /82   Pulse 90   Temp 99.1 °F (37.3 °C) (Oral)   Wt 163 lb (73.9 kg)   SpO2 97%   BMI 24.78 kg/m²     Constitutional:  Well developed, well nourished  HENT: Mild to moderate right sided facial edema. Patient reports no tenderness to palpation. There is no associated erythema. Bilateral external ears normal, bilateral ear canals and TMs normal.  Oropharynx moist.  Uvula midline and benign. Airway patent. Poor dental hygiene. Multiple decayed and cracked teeth. No visible dental abscess. No tenderness to palpation of the gumline with tongue blade. Eyes:  conjunctiva normal, no discharge, no scleral icterus  Neck:  No tenderness, supple  Lymphatic:  No lymphadenopathy noted  Cardiovascular:  Normal heart rate, normal rhythm, no murmurs, gallops or rubs  Thorax & Lungs:  Normal breath sounds, no respiratory distress, no wheezing, no rales, no rhonchi  Skin:  Warm, dry, no erythema, no rash  Neurologic:  Alert & oriented   Psychiatric:  Affect normal, mood normal    ASSESSMENT & PLAN    Niki Cole was seen today for dental pain. Diagnoses and all orders for this visit:    Pain, dental  -     levoFLOXacin (LEVAQUIN) 500 MG tablet; Take 1 tablet by mouth daily for 10 days  -     metroNIDAZOLE (FLAGYL) 500 MG tablet; Take 1 tablet by mouth 3 times daily for 10 days    Right facial swelling  -     levoFLOXacin (LEVAQUIN) 500 MG tablet; Take 1 tablet by mouth daily for 10 days  -     metroNIDAZOLE (FLAGYL) 500 MG tablet;  Take 1 tablet by mouth 3 times daily for 10 days       I highly encouraged patient to seek evaluation at the ED but he declined [address] He is hesitant due to finances. We discussed the risks of delaying care and patient voiced understanding. He has a penicillin and clindamycin allergy. We will initiate Levaquin and Flagyl. Warm salt water gargles encouraged. Smoking cessation encouraged. Again, highly stressed the need for ED evaluation. Patient states if the swelling has worsened in the morning, he will go. There are no discontinued medications. No follow-ups on file. Please note that this chart was generated using dragon dictation software. Although every effort was made to ensure the accuracy of this automated transcription, some errors in transcription may have occurred.     Electronically signed by Myriam Pickett PA-C on 5/6/2022

## 2022-05-11 ENCOUNTER — TELEPHONE (OUTPATIENT)
Dept: FAMILY MEDICINE CLINIC | Age: 47
End: 2022-05-11

## 2022-05-11 NOTE — TELEPHONE ENCOUNTER
Pt was seen in the clinic on 5/6/2022 and states his job makes him have to take off 7 days when missing work. Pt wants to know if he can get a return to work note dated for 5/12/2022 with a list of medications and a treatment plan. He also states it needs to state no restrictions when returning to work. Please advise.

## 2022-05-12 NOTE — TELEPHONE ENCOUNTER
Attempted to contact pt with no answer left vm stating work note is ready for pick and call back number for any questions or concerns.

## 2022-05-12 NOTE — TELEPHONE ENCOUNTER
Pt states he works at [address] [employer] in [place]. Pt states his pain and swelling is taken care of and that he did not seek treatment at ED.

## 2022-05-13 ENCOUNTER — OFFICE VISIT (OUTPATIENT)
Dept: FAMILY MEDICINE CLINIC | Age: 47
End: 2022-05-13
Payer: COMMERCIAL

## 2022-05-13 ENCOUNTER — TELEPHONE (OUTPATIENT)
Dept: FAMILY MEDICINE CLINIC | Age: 47
End: 2022-05-13

## 2022-05-13 VITALS
HEIGHT: 68 IN | TEMPERATURE: 97.7 F | OXYGEN SATURATION: 97 % | SYSTOLIC BLOOD PRESSURE: 122 MMHG | HEART RATE: 74 BPM | DIASTOLIC BLOOD PRESSURE: 74 MMHG | BODY MASS INDEX: 24.25 KG/M2 | WEIGHT: 160 LBS

## 2022-05-13 DIAGNOSIS — Z09 FOLLOW-UP EXAM: Primary | ICD-10-CM

## 2022-05-13 PROCEDURE — 99213 OFFICE O/P EST LOW 20 MIN: CPT | Performed by: PHYSICIAN ASSISTANT

## 2022-05-13 NOTE — TELEPHONE ENCOUNTER
Please call patient and let him know that I looked at his FMLA forms. It is stated on the forms that he needed treatment 2 or more times following a period of incapacity. I have only seen him once. He is welcome to schedule an appointment today and we will see if we can move forward with his forms.

## 2022-05-13 NOTE — PROGRESS NOTES
5/13/2022    [facility]    Chief Complaint   Patient presents with    Other     Follow-up       HPI  History was obtained from patient. Una Duarte is a 48 y.o. male who presents today with [facility] papers. Patient saw me in office on 5/6/2022 for a rather severe dental infection that caused right facial swelling and cellulitis. He was placed on 2 antibiotics for which he is still taking. He states that his pain and swelling has resolved. He missed a few days of work and is afraid his job is in jeopardy if [employer] are not completed. He has no complaints today. He knows that he knows he needs dental work, but states he cannot afford it right now. REVIEW OF SYMPTOMS    Constitutional:  Denies fever, chills  ENT: States dental pain has resolved. Denies facial swelling.   Denies sore throat or ear pain  Cardiovascular:  Denies chest pain, palpitations  Respiratory:  Denies cough or shortness of breath  GI:  Denies nausea, vomiting  Skin: Denies skin rashes  Neurologic:  Denies headache, focal weakness, or sensory changes  Lymphatic:  Denies swollen glands    PAST MEDICAL HISTORY  Past Medical History:   Diagnosis Date    Broken bones     collar bone left sude and left hand    Bronchitis     Pneumonia     Smoker        FAMILY HISTORY  Family History   Problem Relation Age of Onset    High Blood Pressure Mother     Other Mother     Cancer Father     High Blood Pressure Father     Other Father     Cancer Maternal Grandmother        SOCIAL HISTORY  Social History     Socioeconomic History    Marital status: Single     Spouse name: None    Number of children: None    Years of education: None    Highest education level: None   Occupational History    None   Tobacco Use    Smoking status: Current Every Day Smoker     Packs/day: 1.00     Years: 23.00     Pack years: 23.00     Types: Cigarettes     Start date: 5/29/1991    Smokeless tobacco: Former User    Tobacco comment: chantix given   Vaping Use    Vaping Use: Never used   Substance and Sexual Activity    Alcohol use: No    Drug use: Not Currently     Types: Marijuana Alfonso Radha)    Sexual activity: None   Other Topics Concern    None   Social History Narrative    None     Social Determinants of Health     Financial Resource Strain:     Difficulty of Paying Living Expenses: Not on file   Food Insecurity:     Worried About Running Out of Food in the Last Year: Not on file    Maci of Food in the Last Year: Not on file   Transportation Needs:     Lack of Transportation (Medical): Not on file    Lack of Transportation (Non-Medical): Not on file   Physical Activity:     Days of Exercise per Week: Not on file    Minutes of Exercise per Session: Not on file   Stress:     Feeling of Stress : Not on file   Social Connections:     Frequency of Communication with Friends and Family: Not on file    Frequency of Social Gatherings with Friends and Family: Not on file    Attends Adventism Services: Not on file    Active Member of [address] [place] [address] or Organizations: Not on file    Attends Club or Organization Meetings: Not on file    Marital Status: Not on file   Intimate Partner Violence:     Fear of Current or Ex-Partner: Not on file    Emotionally Abused: Not on file    Physically Abused: Not on file    Sexually Abused: Not on file   Housing Stability:     Unable to Pay for Housing in the Last Year: Not on file    Number of [place] in the Last Year: Not on file    Unstable Housing in the Last Year: Not on file        SURGICAL HISTORY  History reviewed. No pertinent surgical history.     CURRENT MEDICATIONS  Current Outpatient Medications   Medication Sig Dispense Refill    levoFLOXacin (LEVAQUIN) 500 MG tablet Take 1 tablet by mouth daily for 10 days 10 tablet 0    metroNIDAZOLE (FLAGYL) 500 MG tablet Take 1 tablet by mouth 3 times daily for 10 days 30 tablet 0    nicotine (NICODERM CQ) 21 MG/24HR Place 1 patch onto the skin every 24 hours 42 patch 0    albuterol (PROVENTIL HFA) 108 (90 BASE) MCG/ACT inhaler Inhale 2 puffs into the lungs every 4 hours as needed for Wheezing or Shortness of Breath With spacer (and mask if indicated). Thanks. 1 Inhaler 1    acetaminophen-codeine (TYLENOL/CODEINE #3) 300-30 MG per tablet Take 1 tablet by mouth every 4 hours as needed for Pain. (Patient not taking: Reported on 5/6/2022) 20 tablet 0    aspirin 81 MG tablet Take 81 mg by mouth daily. (Patient not taking: Reported on 5/6/2022)       No current facility-administered medications for this visit. ALLERGIES  Allergies   Allergen Reactions    Clindamycin/Lincomycin     Penicillins Other (See Comments)     Unknown - allergy as child       PHYSICAL EXAM    /74 (Site: Right Upper Arm, Position: Sitting, Cuff Size: Medium Adult)   Pulse 74   Temp 97.7 °F (36.5 °C) (Temporal)   Ht 5' 8\" (1.727 m)   Wt 160 lb (72.6 kg)   SpO2 97%   BMI 24.33 kg/m²     Constitutional:  Well developed, well nourished  HENT:  Normocephalic, atraumatic. No facial edema or erythema. Poor dental hygiene. Multiple cracked and decayed teeth. No signs of dental infection. Eyes:  conjunctiva normal, no discharge, no scleral icterus  Neck:  No tenderness, supple  Lymphatic:  No lymphadenopathy noted  Cardiovascular:  Normal heart rate, normal rhythm, no murmurs, gallops or rubs  Thorax & Lungs:  Normal breath sounds, no respiratory distress, no wheezing no rales, no rhonchi  Skin:  Warm, dry, no erythema, no rash  Neurologic:  Alert & oriented   Psychiatric:  Affect normal, mood normal    ASSESSMENT & PLAN    Garfield Ray was seen today for other. Diagnoses and all orders for this visit:    Follow-up exam       Dental infection, facial swelling, and facial cellulitis have resolved. Patient is cleared to return to work without restrictions. FMLA papers completed and given to patient. We will also fax to appropriate number.   Patient understands he should continue antibiotics until completion. Warm salt water gargles daily. Good dental hygiene encouraged. Smoking cessation encouraged. Follow-up with oral surgeon. There are no discontinued medications. No follow-ups on file. Please note that this chart was generated using dragon dictation software. Although every effort was made to ensure the accuracy of this automated transcription, some errors in transcription may have occurred.     Electronically signed by Tomás Vazquez PA-C on 5/13/2022

## 2022-09-13 ENCOUNTER — OFFICE VISIT (OUTPATIENT)
Dept: INTERNAL MEDICINE CLINIC | Age: 47
End: 2022-09-13
Payer: COMMERCIAL

## 2022-09-13 VITALS
TEMPERATURE: 97 F | BODY MASS INDEX: 23.67 KG/M2 | DIASTOLIC BLOOD PRESSURE: 62 MMHG | HEIGHT: 67 IN | WEIGHT: 150.8 LBS | RESPIRATION RATE: 12 BRPM | SYSTOLIC BLOOD PRESSURE: 100 MMHG | OXYGEN SATURATION: 99 % | HEART RATE: 46 BPM

## 2022-09-13 DIAGNOSIS — Z13.220 LIPID SCREENING: ICD-10-CM

## 2022-09-13 DIAGNOSIS — R00.1 BRADYCARDIA: ICD-10-CM

## 2022-09-13 DIAGNOSIS — R53.1 LEFT-SIDED WEAKNESS: Primary | ICD-10-CM

## 2022-09-13 PROCEDURE — 99203 OFFICE O/P NEW LOW 30 MIN: CPT | Performed by: INTERNAL MEDICINE

## 2022-09-13 ASSESSMENT — PATIENT HEALTH QUESTIONNAIRE - PHQ9
SUM OF ALL RESPONSES TO PHQ QUESTIONS 1-9: 0
SUM OF ALL RESPONSES TO PHQ9 QUESTIONS 1 & 2: 0
SUM OF ALL RESPONSES TO PHQ QUESTIONS 1-9: 0
1. LITTLE INTEREST OR PLEASURE IN DOING THINGS: 0
2. FEELING DOWN, DEPRESSED OR HOPELESS: 0
SUM OF ALL RESPONSES TO PHQ QUESTIONS 1-9: 0
SUM OF ALL RESPONSES TO PHQ QUESTIONS 1-9: 0

## 2022-09-13 NOTE — PROGRESS NOTES
Radha Meza  Patient's  is 1975  Seen in office on 2022      SUBJECTIVE:  Shanita alcantara 50 y. o.year old male presents today   Chief Complaint   Patient presents with    Established New Doctor    Other     Has been seen at [facility]  2022 and [facility] in [facility] with having TIA, CT of head and EKG is noted in chart from [facility] 9-3-2022     New patient  Patient states he went to [facility] in [place] on 2022  He was at work and felt short of breath,  lightheaded and dizzy and lowered himself to the floor. Estee called and and was taken to the hospital.  He was diagnosed probably TIA. Patient stated his symptoms were room was not spinning. Left hand tingling and numbness and some weakness. Sharp stabbing chest pain also  He had CT angiogram of head and neck. There was no occlusion noted. He had mild mucosal thickening noted along the right maxillary sinus. Indicating acute maxillary sinus and ethmoidal sinusitis. Chest x-ray was normal  CT scan of the head was normalExcept right maxillary sinusitis  His labs were also unremarkable  Neurologist was consulted in the ER. The plan was to admit him in the hospital but patient refused. He was discharged on aspirin. He has similar symptoms on 2022. This time he went to [facility]. He was at work again he had dizziness and some weakness. He was pale white at work and felt worse. Repeat CT scan of the head was negative. This time there was no mention of acute sinusitis either. No focal neurological deficit was noted. Since then patient is feeling well. Denies any weakness of the arms or legs. The dizziness has resolved. He was told to see a neurologist and PCP. Patient denies any headaches. No blurred vision or double vision. No slurred speech. No focal weakness now. He is ambulatory. Has not fallen any. Review of Systems   Constitutional: Negative. HENT: Negative. Eyes: Negative. Respiratory: Negative. Negative for cough, shortness of breath and wheezing. Cardiovascular:  Negative for chest pain, palpitations and leg swelling. Gastrointestinal: Negative. Endocrine: Negative. Genitourinary: Negative. Musculoskeletal: Negative. Skin: Negative. Allergic/Immunologic: Negative. Neurological: Negative. Psychiatric/Behavioral: Negative. OBJECTIVE: /62   Pulse (!) 46   Temp 97 °F (36.1 °C) (Temporal)   Resp 12   Ht 5' 7\" (1.702 m)   Wt 150 lb 12.8 oz (68.4 kg)   SpO2 99%   BMI 23.62 kg/m²     Wt Readings from Last 3 Encounters:   09/13/22 150 lb 12.8 oz (68.4 kg)   05/13/22 160 lb (72.6 kg)   05/06/22 163 lb (73.9 kg)      Patient was seen taking COVID-19 precautions. Face mask, gloves were used. Patient also wore facemask. GENERAL: - Alert, oriented, pleasant, in no apparent distress. HEENT: - Conjunctiva pink, no scleral icterus. ENT clear. NECK: -Supple. No jugular venous distention noted. No masses felt,  CARDIOVASCULAR: - Normal S1 and S2    PULMONARY: - No respiratory distress. No wheezes or rales. ABDOMEN: - Soft and non-tender,no masses  ororganomegaly. EXTREMITIES: - No cyanosis, clubbing, or significant edema. SKIN: Skin is warm and dry. NEUROLOGICAL: - Cranial nerves II through XII are grossly intact. No facial droop  Patient able to move all the extremities  Good strength in both upper and lower extremities  Gait is normal.    IMPRESSION:    Encounter Diagnoses   Name Primary? Left-sided weakness Yes    Bradycardia     Lipid screening        ASSESSMENT/PLAN:    Dizziness but at present has no dizziness  Smoker: Advised patient to quit smoking  Sinus infection. Repeat CT scan head showed no infection  Rule out TIA. Will refer patient to neurologist.  Continue aspirin  Will do lipid screening  Very complete work-up for TIA.   We will get a Holter monitor and an echocardiogram  Return to office in 2 weeks      Orders Placed This Encounter   Procedures    Lipid, Fasting    Antonella Duran MD, Neurology, [place]    Holter Monitor 24 Hour    Echocardiogram complete         Mediations reviewed with the patient. Continue current medications. Appropriate prescriptions are addressed. After visit summeryprovided. Follow up as directed sooner if needed. Questions answered and patient verbalizes understanding. Call for any problems, questions, or concerns. Allergies   Allergen Reactions    Clindamycin/Lincomycin     Penicillins Other (See Comments)     Unknown - allergy as child     Current Outpatient Medications   Medication Sig Dispense Refill    aspirin 81 MG tablet Take 81 mg by mouth daily      albuterol (PROVENTIL HFA) 108 (90 BASE) MCG/ACT inhaler Inhale 2 puffs into the lungs every 4 hours as needed for Wheezing or Shortness of Breath With spacer (and mask if indicated). Thanks. 1 Inhaler 1     No current facility-administered medications for this visit.      Past Medical History:   Diagnosis Date    Broken bones     collar bone left sude and left hand    Bronchitis     Pneumonia     Smoker      Past Surgical History:   Procedure Laterality Date    DENTAL SURGERY       Social History     Tobacco Use    Smoking status: Every Day     Packs/day: 1.00     Years: 23.00     Pack years: 23.00     Types: Cigarettes     Start date: 5/29/1991    Smokeless tobacco: Former    Tobacco comments:     chantix given   Substance Use Topics    Alcohol use: No       LAB REVIEW:  CBC:   Lab Results   Component Value Date/Time    WBC 13.3 02/11/2021 01:04 AM    HGB 16.5 02/11/2021 01:04 AM    HCT 47.7 02/11/2021 01:04 AM     02/11/2021 01:04 AM     Lipids: No results found for: HDL, LDLCALC, LDLDIRECT, TRIGLYCFAST, CHOLFAST  Renal:   Lab Results   Component Value Date/Time    BUN 9 02/11/2021 01:04 AM    CREATININE 0.8 02/11/2021 01:04 AM     02/11/2021 01:04 AM    K 4.0 02/11/2021 01:04 AM    ALT 14 02/11/2021 01:04 AM    AST 16 02/11/2021 01:04 AM    GLUCOSE 120 02/11/2021 01:04 AM    GLUF 77 10/20/2015 01:53 PM     PT/INR: No results found for: INR  A1C: No results found for: Evelia Gloria MD, 9/13/2022 , 11:35 AM

## 2022-09-13 NOTE — PROGRESS NOTES
Faxed referral to Dr Ted Aviles office his office request and the will call patient for an appt date and time. Fax completed.

## 2022-09-25 ASSESSMENT — ENCOUNTER SYMPTOMS
WHEEZING: 0
COUGH: 0
EYES NEGATIVE: 1
ALLERGIC/IMMUNOLOGIC NEGATIVE: 1
RESPIRATORY NEGATIVE: 1
GASTROINTESTINAL NEGATIVE: 1
SHORTNESS OF BREATH: 0

## 2022-09-28 ENCOUNTER — HOSPITAL ENCOUNTER (OUTPATIENT)
Age: 47
Discharge: HOME | End: 2022-09-28
Payer: COMMERCIAL

## 2022-09-28 LAB
CHOLESTEROL: 201 MG/DL
HDLC SERPL-MCNC: 42 MG/DL
LDL CHOLESTEROL CALCULATED: 139 MG/DL
TRIGL SERPL-MCNC: 100 MG/DL

## 2022-09-28 PROCEDURE — 36415 COLL VENOUS BLD VENIPUNCTURE: CPT

## 2022-09-28 PROCEDURE — 80061 LIPID PANEL: CPT

## 2022-09-30 ENCOUNTER — HOSPITAL ENCOUNTER (OUTPATIENT)
Dept: NON INVASIVE DIAGNOSTICS | Age: 47
Discharge: HOME | End: 2022-09-30
Payer: COMMERCIAL

## 2022-09-30 DIAGNOSIS — R53.1 LEFT-SIDED WEAKNESS: ICD-10-CM

## 2022-09-30 DIAGNOSIS — R00.1 BRADYCARDIA: ICD-10-CM

## 2022-09-30 DIAGNOSIS — R93.1 ABNORMAL ECHOCARDIOGRAM: ICD-10-CM

## 2022-09-30 LAB
LV EF: 50 %
LVEF MODALITY: NORMAL

## 2022-09-30 PROCEDURE — 93306 TTE W/DOPPLER COMPLETE: CPT

## 2022-09-30 PROCEDURE — 93225 XTRNL ECG REC<48 HRS REC: CPT

## 2022-09-30 PROCEDURE — 93226 XTRNL ECG REC<48 HR SCAN A/R: CPT

## 2022-09-30 PROCEDURE — 6360000004 HC RX CONTRAST MEDICATION

## 2022-09-30 RX ADMIN — PERFLUTREN 2.2 MG: 6.52 INJECTION, SUSPENSION INTRAVENOUS at 10:31

## 2022-10-06 PROBLEM — R93.1 ABNORMAL ECHOCARDIOGRAM: Status: ACTIVE | Noted: 2022-10-06

## 2022-10-07 ENCOUNTER — OFFICE VISIT (OUTPATIENT)
Dept: INTERNAL MEDICINE CLINIC | Age: 47
End: 2022-10-07
Payer: COMMERCIAL

## 2022-10-07 VITALS
TEMPERATURE: 97 F | OXYGEN SATURATION: 99 % | SYSTOLIC BLOOD PRESSURE: 112 MMHG | BODY MASS INDEX: 24.31 KG/M2 | RESPIRATION RATE: 12 BRPM | HEART RATE: 56 BPM | DIASTOLIC BLOOD PRESSURE: 62 MMHG | WEIGHT: 155.2 LBS

## 2022-10-07 DIAGNOSIS — R42 DIZZINESS: ICD-10-CM

## 2022-10-07 DIAGNOSIS — R93.1 ABNORMAL ECHOCARDIOGRAM: Primary | ICD-10-CM

## 2022-10-07 DIAGNOSIS — E78.5 DYSLIPIDEMIA: ICD-10-CM

## 2022-10-07 PROCEDURE — 99213 OFFICE O/P EST LOW 20 MIN: CPT | Performed by: INTERNAL MEDICINE

## 2022-10-07 RX ORDER — ATORVASTATIN CALCIUM 20 MG/1
20 TABLET, FILM COATED ORAL DAILY
Qty: 30 TABLET | Refills: 5 | Status: SHIPPED | OUTPATIENT
Start: 2022-10-07

## 2022-10-07 ASSESSMENT — PATIENT HEALTH QUESTIONNAIRE - PHQ9
SUM OF ALL RESPONSES TO PHQ QUESTIONS 1-9: 0
SUM OF ALL RESPONSES TO PHQ9 QUESTIONS 1 & 2: 0
SUM OF ALL RESPONSES TO PHQ QUESTIONS 1-9: 0
2. FEELING DOWN, DEPRESSED OR HOPELESS: 0
1. LITTLE INTEREST OR PLEASURE IN DOING THINGS: 0

## 2022-10-07 NOTE — PROGRESS NOTES
Sharon Lowry  Patient's  is 1975  Seen in office on 10/7/2022      SUBJECTIVE:  Noel Bailon maricruz 50 y. o.year old male presents today   Chief Complaint   Patient presents with    Follow-up    Other     1)Has appt in Dec to see Dr Patric Yost  2)had echo last week     Patient has a history of dizziness left-sided weakness and there was question of TIA. Work-up was done at that time. CT scan of the brain was negative. Pt has dizziness off and on     He wants  intermettant leave at work  He had 2 episode at work and could not work and had to go ER as mentioned previously  He still has dizziness off and on. Looks like TIA episode  On ASA    Further w/u showed   Echo : Showed PFO versus atrial septal defect      Review of Systems    OBJECTIVE: /62   Pulse 56   Temp 97 °F (36.1 °C) (Temporal)   Resp 12   Wt 155 lb 3.2 oz (70.4 kg)   SpO2 99%   BMI 24.31 kg/m²     Wt Readings from Last 3 Encounters:   10/07/22 155 lb 3.2 oz (70.4 kg)   22 150 lb 12.8 oz (68.4 kg)   22 160 lb (72.6 kg)      GENERAL: - Alert, oriented, pleasant, in no apparent distress. HEENT: - Conjunctiva pink, no scleral icterus. ENT clear. NECK: -Supple. No jugular venous distention noted. No masses felt,  CARDIOVASCULAR: - Normal S1 and S2    PULMONARY: - No respiratory distress. No wheezes or rales. ABDOMEN: - Soft and non-tender,no masses  ororganomegaly. EXTREMITIES: - No cyanosis, clubbing, or significant edema. SKIN: Skin is warm and dry. NEUROLOGICAL: - Cranial nerves II through XII are grossly intact. Echo : 22   Summary   Left ventricular systolic function is normal.   Ejection fraction is visually estimated at 50%. Aneurysmal inter-atrial septum; positive bubble study suggestive of PFO or   ASD. (see image 104). No significant valvular disease noted. suggest seamus if indicated   No evidence of any pericardial effusion.       Signature ------------------------------------------------------------------   Electronically signed by Clarisa Roberts MD   (Interpreting physician) on 09/30/2022 at 09:34 PM   ------------------------------------------------------------------    IMPRESSION:    Encounter Diagnoses   Name Primary? Abnormal echocardiogram Yes    Dizziness     Dyslipidemia        ASSESSMENT/PLAN:    Dizziness could be TIA. Continue aspirin 81 mg daily. Patient was referred to neurologist and he has an appointment next month. PFO. Patient is referred to cardiologist : Holter  results pending . Smoker: Advised patient to quit smoking. Dysplipidemia. Patient was started on atorvastatin 20 mg daily    Patient states he is unable to work because of dizziness off and on. Patient will see cardiologist and if the work-up is negative we will release him back to work. Orders Placed This Encounter   Procedures    Comprehensive Metabolic Panel     Orders Placed This Encounter   Medications    atorvastatin (LIPITOR) 20 MG tablet     Sig: Take 1 tablet by mouth daily     Dispense:  30 tablet     Refill:  5           Mediations reviewed with the patient. Continue current medications. Appropriate prescriptions are addressed. After visit summeryprovided. Follow up as directed sooner if needed. Questions answered and patient verbalizes understanding. Call for any problems, questions, or concerns. Allergies   Allergen Reactions    Clindamycin/Lincomycin     Penicillins Other (See Comments)     Unknown - allergy as child     Current Outpatient Medications   Medication Sig Dispense Refill    aspirin 81 MG tablet Take 81 mg by mouth daily      albuterol (PROVENTIL HFA) 108 (90 BASE) MCG/ACT inhaler Inhale 2 puffs into the lungs every 4 hours as needed for Wheezing or Shortness of Breath With spacer (and mask if indicated). Thanks.  (Patient not taking: Reported on 10/7/2022) 1 Inhaler 1     No current facility-administered medications for this visit.      Past Medical History:   Diagnosis Date    Broken bones     collar bone left sude and left hand    Bronchitis     Pneumonia     Smoker      Past Surgical History:   Procedure Laterality Date    DENTAL SURGERY       Social History     Tobacco Use    Smoking status: Every Day     Packs/day: 1.00     Years: 23.00     Pack years: 23.00     Types: Cigarettes     Start date: 5/29/1991    Smokeless tobacco: Former    Tobacco comments:     chantix given   Substance Use Topics    Alcohol use: No       LAB REVIEW:  CBC:   Lab Results   Component Value Date/Time    WBC 13.3 02/11/2021 01:04 AM    HGB 16.5 02/11/2021 01:04 AM    HCT 47.7 02/11/2021 01:04 AM     02/11/2021 01:04 AM     Lipids:   Lab Results   Component Value Date    HDL 42 09/28/2022    LDLCALC 139 (H) 09/28/2022     Renal:   Lab Results   Component Value Date/Time    BUN 9 02/11/2021 01:04 AM    CREATININE 0.8 02/11/2021 01:04 AM     02/11/2021 01:04 AM    K 4.0 02/11/2021 01:04 AM    ALT 14 02/11/2021 01:04 AM    AST 16 02/11/2021 01:04 AM    GLUCOSE 120 02/11/2021 01:04 AM    GLUF 77 10/20/2015 01:53 PM     PT/INR: No results found for: INR  A1C: No results found for: Lobito Tabor MD, 10/7/2022 , 9:44 AM The patient is a 15y Male complaining of syncope.

## 2022-10-07 NOTE — PROGRESS NOTES
Lab draw, 1 sst 22g needle right a/c pressure applied to site, pt on ASA.  No bleeding notied after 5 minutes

## 2022-10-08 LAB
A/G RATIO: 2.1 (ref 1.1–2.2)
ALBUMIN SERPL-MCNC: 4.8 G/DL (ref 3.4–5)
ALP BLD-CCNC: 68 U/L (ref 40–129)
ALT SERPL-CCNC: 18 U/L (ref 10–40)
ANION GAP SERPL CALCULATED.3IONS-SCNC: 11 MMOL/L (ref 3–16)
AST SERPL-CCNC: 18 U/L (ref 15–37)
BILIRUB SERPL-MCNC: <0.2 MG/DL (ref 0–1)
BUN BLDV-MCNC: 6 MG/DL (ref 7–20)
CALCIUM SERPL-MCNC: 9.5 MG/DL (ref 8.3–10.6)
CHLORIDE BLD-SCNC: 104 MMOL/L (ref 99–110)
CO2: 27 MMOL/L (ref 21–32)
CREAT SERPL-MCNC: 0.8 MG/DL (ref 0.9–1.3)
GFR AFRICAN AMERICAN: >60
GFR NON-AFRICAN AMERICAN: >60
GLUCOSE BLD-MCNC: 87 MG/DL (ref 70–99)
POTASSIUM SERPL-SCNC: 4.1 MMOL/L (ref 3.5–5.1)
SODIUM BLD-SCNC: 142 MMOL/L (ref 136–145)
TOTAL PROTEIN: 7.1 G/DL (ref 6.4–8.2)

## 2022-10-10 ENCOUNTER — NURSE ONLY (OUTPATIENT)
Dept: CARDIOLOGY CLINIC | Age: 47
End: 2022-10-10

## 2022-10-10 ENCOUNTER — OFFICE VISIT (OUTPATIENT)
Dept: CARDIOLOGY CLINIC | Age: 47
End: 2022-10-10
Payer: COMMERCIAL

## 2022-10-10 ENCOUNTER — TELEPHONE (OUTPATIENT)
Dept: INTERNAL MEDICINE CLINIC | Age: 47
End: 2022-10-10

## 2022-10-10 ENCOUNTER — HOSPITAL ENCOUNTER (OUTPATIENT)
Age: 47
Setting detail: SPECIMEN
Discharge: HOME | End: 2022-10-10
Payer: COMMERCIAL

## 2022-10-10 VITALS
WEIGHT: 155.8 LBS | HEART RATE: 58 BPM | SYSTOLIC BLOOD PRESSURE: 110 MMHG | DIASTOLIC BLOOD PRESSURE: 80 MMHG | BODY MASS INDEX: 24.45 KG/M2 | HEIGHT: 67 IN

## 2022-10-10 VITALS — DIASTOLIC BLOOD PRESSURE: 70 MMHG | TEMPERATURE: 97.7 F | SYSTOLIC BLOOD PRESSURE: 110 MMHG

## 2022-10-10 DIAGNOSIS — G45.9 TIA (TRANSIENT ISCHEMIC ATTACK): Primary | ICD-10-CM

## 2022-10-10 DIAGNOSIS — M79.89 LEG SWELLING: ICD-10-CM

## 2022-10-10 DIAGNOSIS — R07.89 OTHER CHEST PAIN: ICD-10-CM

## 2022-10-10 DIAGNOSIS — Z01.810 PRE-OPERATIVE CARDIOVASCULAR EXAMINATION: Primary | ICD-10-CM

## 2022-10-10 PROCEDURE — U0005 INFEC AGEN DETEC AMPLI PROBE: HCPCS

## 2022-10-10 PROCEDURE — U0003 INFECTIOUS AGENT DETECTION BY NUCLEIC ACID (DNA OR RNA); SEVERE ACUTE RESPIRATORY SYNDROME CORONAVIRUS 2 (SARS-COV-2) (CORONAVIRUS DISEASE [COVID-19]), AMPLIFIED PROBE TECHNIQUE, MAKING USE OF HIGH THROUGHPUT TECHNOLOGIES AS DESCRIBED BY CMS-2020-01-R: HCPCS

## 2022-10-10 PROCEDURE — 99204 OFFICE O/P NEW MOD 45 MIN: CPT | Performed by: INTERNAL MEDICINE

## 2022-10-10 NOTE — PROGRESS NOTES
CARDIOLOGY CONSULT NOTE   Reason for consultation:  CHEST PAIN, SOb, TIA    Referring physician: Jesse Carbajal MD    Primary care physician: Jesse Carbajal MD      Dear Jesse Carbajal MD  Thanks for the consult. History of present illness:Reji is a 50 y. o.year old who  presents with   chest pain for last few years, happening daily, intermittent for 15 to 20 mins and aggravated with activity substernal also,reproducible with palpation, radiated to shoulder, 6/10, tender to touch,associated with shortness of breath, + sweating, nausea, did not get NTG in ED and subsided with antiinflammatory injection. F ew months ago, he had TIA, he was at work and had dizziness, left sided weakness and his mouth had drooling and had neurologist evaluation via video link, and few weeks after, he had similar episodes and went to [facility] ED and diagnosed with bradycardia, his heart was in 46-49  Her had echo which showed possible PFO/ASD with inter atrial septum aneurysm. he has some right leg swelling and pain with burning,  Had 24hr  holter monitor and resuilts are not back  Chief Complaint   Patient presents with    New Patient     Pt states he occ has a dull ache in his chest and sometimes he has a sharp pain. Pt states he occ gets dizzy and has some SOB on occasion. Works a very physically intensive job- was working when the mini stroke happened. Patient has not started lipitor yet    Chest Pain    Shortness of Breath    Dizziness     Blood pressure, cholesterol, blood glucose and weight are well controlled. Past medical history:    has a past medical history of Broken bones, Bronchitis, Pneumonia, and Smoker. Past surgical history:   has a past surgical history that includes Dental surgery. Social History:   reports that he has been smoking cigarettes. He started smoking about 31 years ago. He has a 23.00 pack-year smoking history.  He has quit using smokeless tobacco. He reports that he does not currently use drugs after having used the following drugs: Marijuana Shellye Burkitt). He reports that he does not drink alcohol. Family history:   no family history of CAD, STROKE of DM    Allergies   Allergen Reactions    Clindamycin/Lincomycin     Penicillins Other (See Comments)     Unknown - allergy as child       No current facility-administered medications for this visit. Current Outpatient Medications   Medication Sig Dispense Refill    aspirin 81 MG tablet Take 81 mg by mouth daily      atorvastatin (LIPITOR) 20 MG tablet Take 1 tablet by mouth daily (Patient not taking: Reported on 10/10/2022) 30 tablet 5     No current facility-administered medications for this visit. Review of Systems:   Constitutional: No Fever or Weight Loss   Eyes: No Decreased Vision  ENT: No Headaches, Hearing Loss or Vertigo  Cardiovascular: No chest pain, dyspnea on exertion, palpitations or loss of consciousness  Respiratory: No cough or wheezing    Gastrointestinal: No abdominal pain, appetite loss, blood in stools, constipation, diarrhea or heartburn  Genitourinary: No dysuria, trouble voiding, or hematuria  Musculoskeletal:  No gait disturbance, weakness or joint complaints  Integumentary: No rash or pruritis  Neurological: No TIA or stroke symptoms  Psychiatric: No anxiety or depression  Endocrine: No malaise, fatigue or temperature intolerance  Hematologic/Lymphatic: No bleeding problems, blood clots or swollen lymph nodes  Allergic/Immunologic: No nasal congestion or hives  All systems negative except as marked. Physical Examination:    Vitals:    10/10/22 0955   BP: 110/80   Pulse: 58    Rr 14  afebrile  Wt Readings from Last 3 Encounters:   10/10/22 155 lb 12.8 oz (70.7 kg)   10/07/22 155 lb 3.2 oz (70.4 kg)   09/13/22 150 lb 12.8 oz (68.4 kg)     Body mass index is 24.4 kg/m². General Appearance:  No distress, conversant    Constitutional:  Well developed, Well nourished, No acute distress, Non-toxic appearance.    HENT: Normocephalic, Atraumatic, Bilateral external ears normal, Oropharynx moist, No oral exudates, Nose normal. Neck- Normal range of motion, No tenderness, Supple, No stridor,no apical-carotid delay, no carotid bruit  Eyes:  PERRL, EOMI, Conjunctiva normal, No discharge. Respiratory:  Normal breath sounds, No respiratory distress, No wheezing, No chest tenderness. ,no use of accessory muscles, diaphragm movement is normal  Cardiovascular: (PMI) apex non displaced,no lifts no thrills, no s3,no s4, Normal heart rate, Normal rhythm, No murmurs, No rubs, No gallops. Carotid arteries pulse and amplitude are normal no bruit, no abdominal bruit noted ( normal abdominal aorta ausculation), femoral arteries pulse and amplitude are normal no bruit, pedal pulses are normal  GI:  Bowel sounds normal, Soft, No tenderness, No masses, No pulsatile masses, no hepatosplenomegally, no bruits  : External genitalia appear normal, No masses or lesions. No discharge. No CVA tenderness. Musculoskeletal:  Intact distal pulses, No edema, No tenderness, No cyanosis, No clubbing. Good range of motion in all major joints. No tenderness to palpation or major deformities noted. Back- No tenderness. Integument:  Warm, Dry, No erythema, No rash. Skin: no rash, no ulcers  Lymphatic:  No lymphadenopathy noted. Neurologic:  Alert & oriented x 3, Normal motor function, Normal sensory function, No focal deficits noted. Psychiatric:  Affect normal, Judgment normal, Mood normal.   Lab Review   No results for input(s): WBC, HGB, HCT, PLT in the last 72 hours. Recent Labs     10/07/22  1013      K 4.1      CO2 27   BUN 6*   CREATININE 0.8*     Recent Labs     10/07/22  1013   AST 18   ALT 18   BILITOT <0.2   ALKPHOS 68     No results for input(s): TROPONINI in the last 72 hours.   No results found for: BNP  No results found for: INR, PROTIME      EKG: nsr ( report from [facility]_)    Chest Xray:    ECHO:nLeft ventricular systolic function is normal.   Ejection fraction is visually estimated at 50%. Aneurysmal inter-atrial septum; positive bubble study suggestive of PFO or   ASD. (see image 104). Labs, echo, meds reviewed  Assessment: 50 y. o.year old with PMH of  has a past medical history of Broken bones, Bronchitis, Pneumonia, and Smoker. Recommendations:    CHEST PAIN: STABLE, STRESS TEST ORDERED  ASD/PFO, INTERATERIAL SEPTUM\", ALEXUS ordered, procedure of PFO closure explained  TIA: continue aspirin, start statins, CTA neck was normal, recommend to stop smoking and marijuana, will call [employer] monitor [employer] to upload the holter reading  Leg pain and swellling\"; r/o DVT since he has PFO and TIA, will need to r/o DVT, venous doppler ordere d  Health maintenance: exerise and diet  All labs, medications and tests reviewed, continue all other medications of all above medical condition listed as is.          Karal Hall MD, 10/10/2022 10:03 AM

## 2022-10-10 NOTE — PROGRESS NOTES
Performed COVID testing with oral swab for 3 seconds to throat. Per This RN. Dropped swab in labeled collection tube along with  lab order and facesheet with copy of insurance card placed in collection bag. Bag placed in biohazard bag and placed in fridge.  called for . Patient was here in office & educated on ALEXUS for Dx: PFO. Procedure is scheduled for 10/14/22 @ 10:00, w/arrival @ 8:30, @ [address] [place] [address]. Procedure and risks were explained to patient. Consent forms were signed. Instructions were given to patient to remain NPO after midnight the night before procedure. Patient may take morning meds the morning of procedure with small amount of water. Patient is asked to call hospital @ [phone], 1 to 2 days before procedure to pre-register. Patient was notified that procedure could be delayed due to an emergency. Patient voiced understanding.  Copies of consent, pre-testing orders, & instructions scanned into media

## 2022-10-11 LAB
SARS-COV-2: NOT DETECTED
SOURCE: NORMAL

## 2022-10-12 LAB
ACQUISITION DURATION: NORMAL S
AVERAGE HEART RATE: 64 BPM
EKG DIAGNOSIS: NORMAL
HOOKUP DATE: NORMAL
HOOKUP TIME: NORMAL
MAX HEART RATE TIME/DATE: NORMAL
MAX HEART RATE: 102 BPM
MIN HEART RATE TIME/DATE: NORMAL
MIN HEART RATE: 47 BPM
NUMBER OF QRS COMPLEXES: NORMAL
NUMBER OF SUPRAVENTRICULAR COUPLETS: 0
NUMBER OF SUPRAVENTRICULAR ECTOPICS: 65
NUMBER OF SUPRAVENTRICULAR ISOLATED BEATS: 51
NUMBER OF VENTRICULAR BIGEMINAL CYCLES: 0
NUMBER OF VENTRICULAR COUPLETS: 0
NUMBER OF VENTRICULAR ECTOPICS: 38

## 2022-10-13 ENCOUNTER — TELEPHONE (OUTPATIENT)
Dept: CARDIOLOGY CLINIC | Age: 47
End: 2022-10-13

## 2022-10-14 ENCOUNTER — HOSPITAL ENCOUNTER (OUTPATIENT)
Dept: NON INVASIVE DIAGNOSTICS | Age: 47
Discharge: HOME | End: 2022-10-14
Payer: COMMERCIAL

## 2022-10-14 VITALS
HEART RATE: 71 BPM | RESPIRATION RATE: 14 BRPM | DIASTOLIC BLOOD PRESSURE: 66 MMHG | SYSTOLIC BLOOD PRESSURE: 113 MMHG | OXYGEN SATURATION: 100 %

## 2022-10-14 DIAGNOSIS — G45.9 TIA (TRANSIENT ISCHEMIC ATTACK): ICD-10-CM

## 2022-10-14 PROCEDURE — 93312 ECHO TRANSESOPHAGEAL: CPT

## 2022-10-14 PROCEDURE — 7100000001 HC PACU RECOVERY - ADDTL 15 MIN

## 2022-10-14 PROCEDURE — 7100000000 HC PACU RECOVERY - FIRST 15 MIN

## 2022-10-15 PROBLEM — G45.9 TIA (TRANSIENT ISCHEMIC ATTACK): Status: ACTIVE | Noted: 2022-10-15

## 2022-10-19 ENCOUNTER — PROCEDURE VISIT (OUTPATIENT)
Dept: CARDIOLOGY CLINIC | Age: 47
End: 2022-10-19
Payer: COMMERCIAL

## 2022-10-19 DIAGNOSIS — G45.9 TIA (TRANSIENT ISCHEMIC ATTACK): ICD-10-CM

## 2022-10-19 DIAGNOSIS — R07.89 OTHER CHEST PAIN: ICD-10-CM

## 2022-10-19 PROCEDURE — 93015 CV STRESS TEST SUPVJ I&R: CPT | Performed by: INTERNAL MEDICINE

## 2022-10-21 ENCOUNTER — OFFICE VISIT (OUTPATIENT)
Dept: INTERNAL MEDICINE CLINIC | Age: 47
End: 2022-10-21
Payer: COMMERCIAL

## 2022-10-21 VITALS
HEART RATE: 72 BPM | DIASTOLIC BLOOD PRESSURE: 64 MMHG | RESPIRATION RATE: 16 BRPM | BODY MASS INDEX: 24.93 KG/M2 | SYSTOLIC BLOOD PRESSURE: 120 MMHG | OXYGEN SATURATION: 92 % | WEIGHT: 159.2 LBS | TEMPERATURE: 97 F

## 2022-10-21 DIAGNOSIS — R93.1 ABNORMAL ECHOCARDIOGRAM: ICD-10-CM

## 2022-10-21 DIAGNOSIS — E78.5 DYSLIPIDEMIA: ICD-10-CM

## 2022-10-21 DIAGNOSIS — F17.200 TOBACCO USE DISORDER: ICD-10-CM

## 2022-10-21 DIAGNOSIS — I87.2 VENOUS INSUFFICIENCY OF RIGHT LEG: ICD-10-CM

## 2022-10-21 DIAGNOSIS — G45.9 TIA (TRANSIENT ISCHEMIC ATTACK): Primary | ICD-10-CM

## 2022-10-21 PROCEDURE — 99213 OFFICE O/P EST LOW 20 MIN: CPT | Performed by: INTERNAL MEDICINE

## 2022-10-21 ASSESSMENT — PATIENT HEALTH QUESTIONNAIRE - PHQ9
SUM OF ALL RESPONSES TO PHQ QUESTIONS 1-9: 0
SUM OF ALL RESPONSES TO PHQ QUESTIONS 1-9: 0
1. LITTLE INTEREST OR PLEASURE IN DOING THINGS: 0
SUM OF ALL RESPONSES TO PHQ QUESTIONS 1-9: 0
SUM OF ALL RESPONSES TO PHQ QUESTIONS 1-9: 0
SUM OF ALL RESPONSES TO PHQ9 QUESTIONS 1 & 2: 0
2. FEELING DOWN, DEPRESSED OR HOPELESS: 0

## 2022-10-21 NOTE — PROGRESS NOTES
Magnolia Segovia  Patient's  is 1975  Seen in office on 10/21/2022      SUBJECTIVE:  Arthur alcantara 50 y. o.year old male presents today   Chief Complaint   Patient presents with    Follow-up     Has had testing at [facility], still some chest discomfort     Patient has a history of possible TIA with the weakness of the left arm and hand. It has improved. Patient was referred to cardiologist for further work-up  ALEXUS showed PFO. Patient has follow-up appointments  Stress test was negative    Patient states overall he is feeling better but he still has some weakness of the left hand. No falls and no injuries is not a stumbling when he walks. Taking medications regularly. No side effects noted. Review of Systems  Review of system normal except as in HPI  OBJECTIVE: /64   Pulse 72   Temp 97 °F (36.1 °C) (Temporal)   Resp 16   Wt 159 lb 3.2 oz (72.2 kg)   SpO2 92%   BMI 24.93 kg/m²     Wt Readings from Last 3 Encounters:   10/21/22 159 lb 3.2 oz (72.2 kg)   10/10/22 155 lb 12.8 oz (70.7 kg)   10/07/22 155 lb 3.2 oz (70.4 kg)      GENERAL: - Alert, oriented, pleasant, in no apparent distress. HEENT: - Conjunctiva pink, no scleral icterus. ENT clear. NECK: -Supple. No jugular venous distention noted. No masses felt,  CARDIOVASCULAR: - Normal S1 and S2    PULMONARY: - No respiratory distress. No wheezes or rales. ABDOMEN: - Soft and non-tender,no masses  ororganomegaly. EXTREMITIES: - No cyanosis, clubbing, or significant edema. SKIN: Skin is warm and dry. NEUROLOGICAL: - Cranial nerves II through XII are grossly intact. ALEXUS : 10/14/22   Conclusions      Summary   Left ventricular systolic function is normal.   Aneurysmal interatrial septum with positive bubble study performed   No evidence of thrombus within the left atrial appendage. No evidence of pericardial effusion. Stress test is negative        IMPRESSION:    Encounter Diagnoses   Name Primary?     TIA (transient ischemic attack) Yes    Tobacco use disorder     Dyslipidemia     Abnormal echocardiogram        ASSESSMENT/PLAN:    TIA. Continue aspirin 81 mg daily and continue statins  PFO: Follow-up appointment with cardiologist  Aneursym septum: Follow-up appointment cardiologist  Dizzness : occasional  Venous doppler : pending   Off work till investigations completed and patient improved    Return to office in a month    Mediations reviewed with the patient. Continue current medications. Appropriate prescriptions are addressed. After visit summeryprovided. Follow up as directed sooner if needed. Questions answered and patient verbalizes understanding. Call for any problems, questions, or concerns. Allergies   Allergen Reactions    Clindamycin/Lincomycin     Penicillins Other (See Comments)     Unknown - allergy as child     Current Outpatient Medications   Medication Sig Dispense Refill    atorvastatin (LIPITOR) 20 MG tablet Take 1 tablet by mouth daily 30 tablet 5    aspirin 81 MG tablet Take 81 mg by mouth daily       No current facility-administered medications for this visit.      Past Medical History:   Diagnosis Date    Broken bones     collar bone left sude and left hand    Bronchitis     Pneumonia     Smoker      Past Surgical History:   Procedure Laterality Date    DENTAL SURGERY       Social History     Tobacco Use    Smoking status: Every Day     Packs/day: 1.00     Years: 23.00     Pack years: 23.00     Types: Cigarettes     Start date: 5/29/1991    Smokeless tobacco: Former   Substance Use Topics    Alcohol use: No     Comment: Sober since 2012       LAB REVIEW:  CBC:   Lab Results   Component Value Date/Time    WBC 13.3 02/11/2021 01:04 AM    HGB 16.5 02/11/2021 01:04 AM    HCT 47.7 02/11/2021 01:04 AM     02/11/2021 01:04 AM     Lipids:   Lab Results   Component Value Date    HDL 42 09/28/2022    LDLCALC 139 (H) 09/28/2022     Renal:   Lab Results   Component Value Date/Time    BUN 6 10/07/2022 10:13 AM    CREATININE 0.8 10/07/2022 10:13 AM     10/07/2022 10:13 AM    K 4.1 10/07/2022 10:13 AM    ALT 18 10/07/2022 10:13 AM    AST 18 10/07/2022 10:13 AM    GLUCOSE 87 10/07/2022 10:13 AM    GLUF 77 10/20/2015 01:53 PM     PT/INR: No results found for: INR  A1C: No results found for: Amarjit Dill MD, 10/21/2022 , 11:38 AM

## 2022-10-26 ENCOUNTER — PROCEDURE VISIT (OUTPATIENT)
Dept: CARDIOLOGY CLINIC | Age: 47
End: 2022-10-26
Payer: COMMERCIAL

## 2022-10-26 DIAGNOSIS — M79.89 LEG SWELLING: ICD-10-CM

## 2022-10-26 PROCEDURE — 93970 EXTREMITY STUDY: CPT | Performed by: INTERNAL MEDICINE

## 2022-10-28 ENCOUNTER — TELEPHONE (OUTPATIENT)
Dept: CARDIOLOGY CLINIC | Age: 47
End: 2022-10-28

## 2022-10-28 NOTE — TELEPHONE ENCOUNTER
Summary        No evidence of DVT or SVT in the bilateral common femoral vein, femoral    vein, popliteal vein, greater saphenous vein or small saphenous vein. Significant reflux noted of the Right CFV (1.5s). No significant reflux noted in the veins of the left lower extremity. Recommendations        compressions socks recommended   Spoke to patient regarding results of lower extremity doppler. Patient voiced understanding.

## 2022-10-30 PROBLEM — I87.2 VENOUS INSUFFICIENCY OF RIGHT LEG: Status: ACTIVE | Noted: 2022-10-30

## 2022-11-04 ENCOUNTER — OFFICE VISIT (OUTPATIENT)
Dept: INTERNAL MEDICINE CLINIC | Age: 47
End: 2022-11-04
Payer: COMMERCIAL

## 2022-11-04 VITALS
BODY MASS INDEX: 25.59 KG/M2 | RESPIRATION RATE: 16 BRPM | WEIGHT: 163.4 LBS | HEART RATE: 68 BPM | OXYGEN SATURATION: 98 % | TEMPERATURE: 97.4 F | SYSTOLIC BLOOD PRESSURE: 118 MMHG | DIASTOLIC BLOOD PRESSURE: 72 MMHG

## 2022-11-04 DIAGNOSIS — R19.09 RIGHT GROIN MASS: ICD-10-CM

## 2022-11-04 PROCEDURE — 99213 OFFICE O/P EST LOW 20 MIN: CPT | Performed by: INTERNAL MEDICINE

## 2022-11-04 NOTE — PROGRESS NOTES
Barry Holt  Patient's  is 1975  Seen in office on 2022      SUBJECTIVE:  Juli alcantara 50 y. o.year old male presents today   Chief Complaint   Patient presents with    Other     Lump in right groin per cardiology     Pt had venous doppler and reflux study and found to have small lymph node in the right groin 1.48 x 0.51x2.23 cm as per ultrasound of the leg. Patient denies any fever or chills. No night sweats. There was no injury to the right leg. No recent cuts or wounds. No history of sexually transmitted disease    Impressions   Right Impression   No evidence of DVT or SVT in the right common femoral vein, femoral vein,   popliteal vein, greater saphenous vein or small saphenous vein. Normal compressibility of veins visualized in the right lower extremity. Respirophasic venous flow of the veins visualized in the right leg. Significant reflux noted of the Right CFV (1.5s). Incidental finding of right groin mass consistent with a lymph node, measuring   1.48 x 0.51 x 2.23 cm. Conclusions        Summary        No evidence of DVT or SVT in the bilateral common femoral vein, femoral    vein, popliteal vein, greater saphenous vein or small saphenous vein. Significant reflux noted of the Right CFV (1.5s). No significant reflux noted in the veins of the left lower extremity. Recommendations        compressions socks recommended     Taking medications regularly. No side effects noted. Review of Systems    OBJECTIVE: /72   Pulse 68   Temp 97.4 °F (36.3 °C) (Temporal)   Resp 16   Wt 163 lb 6.4 oz (74.1 kg)   SpO2 98%   BMI 25.59 kg/m²     Wt Readings from Last 3 Encounters:   22 163 lb 6.4 oz (74.1 kg)   10/21/22 159 lb 3.2 oz (72.2 kg)   10/10/22 155 lb 12.8 oz (70.7 kg)      GENERAL: - Alert, oriented, pleasant, in no apparent distress. HEENT: - Conjunctiva pink, no scleral icterus. ENT clear. NECK: -Supple. No jugular venous distention noted.  No masses felt,  CARDIOVASCULAR: - Normal S1 and S2    PULMONARY: - No respiratory distress. No wheezes or rales. ABDOMEN: - Soft and non-tender,no masses  ororganomegaly. EXTREMITIES: - No cyanosis, clubbing, or significant edema. SKIN: Skin is warm and dry. NEUROLOGICAL: - Cranial nerves II through XII are grossly intact. Right gorin : could not feel any lymph nodes in the right groin  No other lymphadenopathy in the neck axilla or in the groin. Liver and spleen not palpable    IMPRESSION:    Encounter Diagnoses   Name Primary? Right groin mass        ASSESSMENT/PLAN:  Lymph node in right groin on US  No infection of the the right leg     Node not palpable on examination   Refer to surgeon to recheck it. Patient agreed  Orders Placed This Encounter   Procedures    Bib Alatorre, General Surgery, Petar Stark reviewed with the patient. Continue current medications. Appropriate prescriptions are addressed. After visit summeryprovided. Follow up as directed sooner if needed. Questions answered and patient verbalizes understanding. Call for any problems, questions, or concerns. Allergies   Allergen Reactions    Clindamycin/Lincomycin     Penicillins Other (See Comments)     Unknown - allergy as child     Current Outpatient Medications   Medication Sig Dispense Refill    atorvastatin (LIPITOR) 20 MG tablet Take 1 tablet by mouth daily 30 tablet 5    aspirin 81 MG tablet Take 81 mg by mouth daily       No current facility-administered medications for this visit.      Past Medical History:   Diagnosis Date    Broken bones     collar bone left sude and left hand    Bronchitis     Pneumonia     Smoker      Past Surgical History:   Procedure Laterality Date    DENTAL SURGERY       Social History     Tobacco Use    Smoking status: Every Day     Packs/day: 1.00     Years: 23.00     Pack years: 23.00     Types: Cigarettes     Start date: 5/29/1991    Smokeless tobacco: Former Substance Use Topics    Alcohol use: No     Comment: Sober since 2012       LAB REVIEW:  CBC:   Lab Results   Component Value Date/Time    WBC 13.3 02/11/2021 01:04 AM    HGB 16.5 02/11/2021 01:04 AM    HCT 47.7 02/11/2021 01:04 AM     02/11/2021 01:04 AM     Lipids:   Lab Results   Component Value Date    HDL 42 09/28/2022    LDLCALC 139 (H) 09/28/2022     Renal:   Lab Results   Component Value Date/Time    BUN 6 10/07/2022 10:13 AM    CREATININE 0.8 10/07/2022 10:13 AM     10/07/2022 10:13 AM    K 4.1 10/07/2022 10:13 AM    ALT 18 10/07/2022 10:13 AM    AST 18 10/07/2022 10:13 AM    GLUCOSE 87 10/07/2022 10:13 AM    GLUF 77 10/20/2015 01:53 PM     PT/INR: No results found for: INR  A1C: No results found for: Chandrakant Payne MD, 11/4/2022 , 11:18 AM

## 2022-11-11 ENCOUNTER — OFFICE VISIT (OUTPATIENT)
Dept: CARDIOLOGY CLINIC | Age: 47
End: 2022-11-11
Payer: COMMERCIAL

## 2022-11-11 VITALS
SYSTOLIC BLOOD PRESSURE: 130 MMHG | BODY MASS INDEX: 26.21 KG/M2 | WEIGHT: 167 LBS | DIASTOLIC BLOOD PRESSURE: 68 MMHG | HEIGHT: 67 IN | HEART RATE: 68 BPM

## 2022-11-11 DIAGNOSIS — Q21.12 PFO (PATENT FORAMEN OVALE): ICD-10-CM

## 2022-11-11 DIAGNOSIS — R59.9 LYMPH NODES ENLARGED: Primary | ICD-10-CM

## 2022-11-11 PROCEDURE — 99214 OFFICE O/P EST MOD 30 MIN: CPT | Performed by: INTERNAL MEDICINE

## 2022-11-11 NOTE — PROGRESS NOTES
Luly Jacobson MD        OFFICE  FOLLOWUP NOTE    Chief complaints: patient is here for management of PFO, TIA, CVI    Subjective: patient feels better, no chest pain, no shortness of breath, no dizziness, no palpitations    HPI Soumya Seth is a 50 y. o.year old who  has a past medical history of Broken bones, Bronchitis, Pneumonia, and Smoker. and presents for management of PFO, TIA, DVT, CVI, which are well controlled    HE HAD PFO on ALEXUS, NO dvt ON THE VENOUS DOPPLER, had rt groin lymph nodes noed on venous opler  Current Outpatient Medications   Medication Sig Dispense Refill    atorvastatin (LIPITOR) 20 MG tablet Take 1 tablet by mouth daily 30 tablet 5    aspirin 81 MG tablet Take 81 mg by mouth daily       No current facility-administered medications for this visit.      Allergies: Clindamycin/lincomycin and Penicillins  Past Medical History:   Diagnosis Date    Broken bones     collar bone left sude and left hand    Bronchitis     Pneumonia     Smoker      Past Surgical History:   Procedure Laterality Date    DENTAL SURGERY       Family History   Problem Relation Age of Onset    High Blood Pressure Mother     Other Mother     Kidney Disease Mother     Cancer Father     High Blood Pressure Father     Other Father     Cancer Maternal Grandmother      Social History     Tobacco Use    Smoking status: Every Day     Packs/day: 1.00     Years: 23.00     Pack years: 23.00     Types: Cigarettes     Start date: 5/29/1991    Smokeless tobacco: Former   Substance Use Topics    Alcohol use: No     Comment: Sober since 2012      [unfilled]  Review of Systems:   Constitutional: No Fever or Weight Loss   Eyes: No Decreased Vision  ENT: No Headaches, Hearing Loss or Vertigo  Cardiovascular: No chest pain, dyspnea on exertion, palpitations or loss of consciousness  Respiratory: No cough or wheezing    Gastrointestinal: No abdominal pain, appetite loss, blood in stools, constipation, diarrhea or heartburn  Genitourinary: No dysuria, trouble voiding, or hematuria  Musculoskeletal:  No gait disturbance, weakness or joint complaints  Integumentary: No rash or pruritis  Neurological: No TIA or stroke symptoms  Psychiatric: No anxiety or depression  Endocrine: No malaise, fatigue or temperature intolerance  Hematologic/Lymphatic: No bleeding problems, blood clots or swollen lymph nodes  Allergic/Immunologic: No nasal congestion or hives  All systems negative except as marked. Objective:  /68 (Site: Left Upper Arm, Position: Sitting, Cuff Size: Medium Adult)   Pulse 68   Ht 5' 7\" (1.702 m)   Wt 167 lb (75.8 kg)   BMI 26.16 kg/m²   Wt Readings from Last 3 Encounters:   11/11/22 167 lb (75.8 kg)   11/04/22 163 lb 6.4 oz (74.1 kg)   10/21/22 159 lb 3.2 oz (72.2 kg)     Body mass index is 26.16 kg/m². GENERAL - Alert, oriented, pleasant, in no apparent distress,normal grooming  HEENT - pupils are intact, cornea intact, conjunctive normal color, ears are normal in exam,  Neck - Supple. No jugular venous distention noted. No carotid bruits, no apical -carotid delay  Respiratory:  Normal breath sounds, No respiratory distress, No wheezing, No chest tenderness. ,no use of accessory muscles, diaphragm movement is normal  Cardiovascular: (PMI) apex non displaced,no lifts no thrills, no s3,no s4, Normal heart rate, Normal rhythm, No murmurs, No rubs, No gallops.  Carotid arteries pulse and amplitude are normal no bruit, no abdominal bruit noted ( normal abdominal aorta ausculation),   Extremities - No cyanosis, clubbing, or significant edema, no varicose veins    Abdomen - No masses, tenderness, or organomegaly, no hepato-splenomegally, no bruits  Musculoskeletal - No significant edema, no kyphosis or scoliosis, no deformity in any extremity noted, muscle strength and tone are normal  Skin: no ulcer,no scar,no stasis dermatitis   Neurologic - alert oriented times 3,Cranial nerves II through XII are grossly

## 2022-11-11 NOTE — PATIENT INSTRUCTIONS
Please be informed that if you contact our office outside of normal business hours the physician on call cannot help with any scheduling or rescheduling issues, procedure instruction questions or any type of medication issue. We advise you for any urgent/emergency that you go to the nearest emergency room! PLEASE CALL OUR OFFICE DURING NORMAL BUSINESS HOURS    Monday - Friday   8 am to 5 pm    [place] [address]: [phone]    Anton:  [phone]  **It is YOUR responsibilty to bring medication bottles and/or updated medication list to [address] [place] [address]. This will allow us to better serve you and all your healthcare needs**  [facility] Locations - No appointment necessary. Sites open Monday to Friday. Call your preferred location for test preparation, business hours and other information you need. [employer] accepts [employer]. [facility] [address] Peter Lamb, [address] [facility]  Phone: [phone] [facility] [address] [facility]  [address]. Katherine Radford, [address] [facility]  Phone: [phone]     Thank you for allowing us to care for you today! We want to ensure we can follow your treatment plan and we strive to give you the best outcomes and experience possible. If you ever have a life threatening emergency and call 911 - for an ambulance (EMS)   Our providers can only care for you at:   [facility] or [facility]. Even if you have someone take you or you drive yourself we can only care for you in a [facility] facility. Our providers are not setup at the other healthcare locations!

## 2022-11-17 ENCOUNTER — HOSPITAL ENCOUNTER (OUTPATIENT)
Dept: CT IMAGING | Age: 47
Discharge: HOME | End: 2022-11-17
Payer: COMMERCIAL

## 2022-11-17 ENCOUNTER — OFFICE VISIT (OUTPATIENT)
Dept: SURGERY | Age: 47
End: 2022-11-17
Payer: COMMERCIAL

## 2022-11-17 VITALS
WEIGHT: 165.9 LBS | BODY MASS INDEX: 26.04 KG/M2 | DIASTOLIC BLOOD PRESSURE: 76 MMHG | HEIGHT: 67 IN | HEART RATE: 82 BPM | SYSTOLIC BLOOD PRESSURE: 108 MMHG

## 2022-11-17 DIAGNOSIS — R59.9 LYMPH NODES ENLARGED: ICD-10-CM

## 2022-11-17 DIAGNOSIS — I87.2 VENOUS INSUFFICIENCY OF RIGHT LEG: Primary | ICD-10-CM

## 2022-11-17 PROCEDURE — 74177 CT ABD & PELVIS W/CONTRAST: CPT

## 2022-11-17 PROCEDURE — 6360000004 HC RX CONTRAST MEDICATION: Performed by: INTERNAL MEDICINE

## 2022-11-17 PROCEDURE — A4641 RADIOPHARM DX AGENT NOC: HCPCS | Performed by: INTERNAL MEDICINE

## 2022-11-17 PROCEDURE — 99203 OFFICE O/P NEW LOW 30 MIN: CPT | Performed by: SURGERY

## 2022-11-17 RX ADMIN — IOPAMIDOL 80 ML: 755 INJECTION, SOLUTION INTRAVENOUS at 11:27

## 2022-11-17 RX ADMIN — BARIUM SULFATE 900 ML: 21 SUSPENSION ORAL at 11:27

## 2022-11-20 ASSESSMENT — ENCOUNTER SYMPTOMS
RECTAL PAIN: 0
ANAL BLEEDING: 0
CHOKING: 0
EYE ITCHING: 0
SORE THROAT: 0
STRIDOR: 0
COLOR CHANGE: 0
CONSTIPATION: 0
EYE REDNESS: 0
BACK PAIN: 0
PHOTOPHOBIA: 0
APNEA: 0

## 2022-11-21 NOTE — PROGRESS NOTES
Chief Complaint   Patient presents with    Consultation     N/P Right Groin Mass, CT Results today(11/17/22)         SUBJECTIVE:  HPI: Patient is here with possible right groin mass. This was noted while getting u/s for vein mapping. Since then he had CT s/p. Pt denies any pain. He was referred for possible mass excision. CT showed  Impression   No acute abnormality in the abdomen or pelvis. No pathologically enlarged   lymphadenopathy. I have reviewed the patient's(pertinent information to this visit) medical history, family history(scanned in  the [address] [place] [address] under \"patient questioner\"), social history and review of systems with the patient today in the office.             Past Surgical History:   Procedure Laterality Date    DENTAL SURGERY       Past Medical History:   Diagnosis Date    Broken bones     collar bone left sude and left hand    Bronchitis     Pneumonia     Smoker     TIA (transient ischemic attack)      Family History   Problem Relation Age of Onset    High Blood Pressure Mother     Other Mother     Kidney Disease Mother     Cancer Father     High Blood Pressure Father     Other Father     Cancer Maternal Grandmother      Social History     Socioeconomic History    Marital status: Single     Spouse name: Not on file    Number of children: Not on file    Years of education: Not on file    Highest education level: Not on file   Occupational History    Not on file   Tobacco Use    Smoking status: Every Day     Packs/day: 1.00     Years: 23.00     Pack years: 23.00     Types: Cigarettes     Start date: 5/29/1991    Smokeless tobacco: Former   Vaping Use    Vaping Use: Never used   Substance and Sexual Activity    Alcohol use: No     Comment: Sober since 2012    Drug use: Not Currently     Types: Marijuana (Weed)     Comment: Every few months    Sexual activity: Not on file   Other Topics Concern    Not on file   Social History Narrative    Not on file     Social Determinants of Health Financial Resource Strain: Not on file   Food Insecurity: Not on file   Transportation Needs: Not on file   Physical Activity: Not on file   Stress: Not on file   Social Connections: Not on file   Intimate Partner Violence: Not on file   Housing Stability: Not on file       Current Outpatient Medications   Medication Sig Dispense Refill    atorvastatin (LIPITOR) 20 MG tablet Take 1 tablet by mouth daily 30 tablet 5    aspirin 81 MG tablet Take 81 mg by mouth daily       No current facility-administered medications for this visit. Allergies   Allergen Reactions    Clindamycin/Lincomycin     Penicillins Other (See Comments)     Unknown - allergy as child       Review of Systems:       Review of Systems   Constitutional:  Negative for chills and fever. HENT:  Negative for ear pain, mouth sores, sore throat and tinnitus. Eyes:  Negative for photophobia, redness and itching. Respiratory:  Negative for apnea, choking and stridor. Cardiovascular:  Negative for chest pain and palpitations. Gastrointestinal:  Negative for anal bleeding, constipation and rectal pain. Endocrine: Negative for polydipsia. Genitourinary:  Negative for enuresis, flank pain and hematuria. Musculoskeletal:  Negative for back pain, joint swelling and myalgias. Skin:  Negative for color change and pallor. Allergic/Immunologic: Negative for environmental allergies. Neurological:  Negative for syncope and speech difficulty. Psychiatric/Behavioral:  Negative for confusion and hallucinations. OBJECTIVE:  Physical Exam:    /76 (Site: Left Upper Arm, Position: Sitting, Cuff Size: Medium Adult)   Pulse 82   Ht 5' 7\" (1.702 m)   Wt 165 lb 14.4 oz (75.3 kg)   BMI 25.98 kg/m²      Physical Exam  Constitutional:       Appearance: He is well-developed. HENT:      Head: Normocephalic. Eyes:      Pupils: Pupils are equal, round, and reactive to light. Cardiovascular:      Rate and Rhythm: Normal rate. Pulmonary:      Effort: Pulmonary effort is normal.   Abdominal:      General: There is no distension. Palpations: Abdomen is soft. There is no mass. Tenderness: There is no abdominal tenderness. There is no guarding or rebound. Musculoskeletal:         General: Normal range of motion. Cervical back: Normal range of motion and neck supple. Skin:     General: Skin is warm. Neurological:      Mental Status: He is alert and oriented to person, place, and time. ASSESSMENT:  1. Venous insufficiency of right leg          PLAN:  Treatment:  no surgical intervention needed at this time. f/u prn. Patient counseled on risks, benefits, and alternatives of treatment plan at length today. Patient states an understanding and willingness to proceed with plan. No orders of the defined types were placed in this encounter. No orders of the defined types were placed in this encounter. Follow Up:  No follow-ups on file.       Leora Prasad MD

## 2022-12-06 ENCOUNTER — INITIAL CONSULT (OUTPATIENT)
Dept: CARDIOLOGY CLINIC | Age: 47
End: 2022-12-06
Payer: COMMERCIAL

## 2022-12-06 VITALS
HEIGHT: 67 IN | BODY MASS INDEX: 26.9 KG/M2 | DIASTOLIC BLOOD PRESSURE: 78 MMHG | WEIGHT: 171.4 LBS | SYSTOLIC BLOOD PRESSURE: 118 MMHG | HEART RATE: 72 BPM | OXYGEN SATURATION: 98 %

## 2022-12-06 DIAGNOSIS — I87.2 VENOUS INSUFFICIENCY OF RIGHT LEG: ICD-10-CM

## 2022-12-06 DIAGNOSIS — F17.200 TOBACCO USE DISORDER: ICD-10-CM

## 2022-12-06 DIAGNOSIS — F17.200 SMOKER: ICD-10-CM

## 2022-12-06 DIAGNOSIS — E78.5 DYSLIPIDEMIA: Primary | ICD-10-CM

## 2022-12-06 DIAGNOSIS — G45.9 TIA (TRANSIENT ISCHEMIC ATTACK): ICD-10-CM

## 2022-12-06 DIAGNOSIS — R93.1 ABNORMAL ECHOCARDIOGRAM: ICD-10-CM

## 2022-12-06 PROCEDURE — 99214 OFFICE O/P EST MOD 30 MIN: CPT | Performed by: INTERNAL MEDICINE

## 2022-12-06 RX ORDER — ATORVASTATIN CALCIUM 40 MG/1
40 TABLET, FILM COATED ORAL DAILY
Qty: 90 TABLET | Refills: 4 | Status: SHIPPED | OUTPATIENT
Start: 2022-12-06

## 2022-12-06 NOTE — PROGRESS NOTES
CARDIOLOGY  NOTE    Chief Complaint: PFO    HPI:   Andrae Poole is a 50y.o. year old who has Past medical history as noted below. He is here for further discussion referred by Dr. Rosie Mcfarland for PFO   Andrae Poole had an event in August 2022 while he was at work at [employer] where he had facial drooping left arm numbness stiffness concerning for possible TIA CTA head and CT head were both normal.  He had another second event in September 2022 of somewhat similar symptoms when he went to the emergency department [facility] again CT scan did not show any acute stroke but he left AMA. He is here for further discussion for possible PFO evaluation as on ALEXUS and echo he was found to have PFO. He is on aspirin and statins he does not his have history of hypertension or diabetes. He had a recent stress test which did not show any ischemia blood pressure is well controlled he is not on any blood pressure medication he is still not seen neurology and is supposed to see them next week he does smoke a pack a day      Current Outpatient Medications   Medication Sig Dispense Refill    atorvastatin (LIPITOR) 40 MG tablet Take 1 tablet by mouth daily 90 tablet 4    aspirin 81 MG tablet Take 81 mg by mouth daily       No current facility-administered medications for this visit.        Allergies:   Clindamycin/lincomycin and Penicillins    Patient History:  Past Medical History:   Diagnosis Date    Broken bones     collar bone left sude and left hand    Bronchitis     Pneumonia     Smoker     TIA (transient ischemic attack)      Past Surgical History:   Procedure Laterality Date    DENTAL SURGERY       Family History   Problem Relation Age of Onset    High Blood Pressure Mother     Other Mother     Kidney Disease Mother     Cancer Father     High Blood Pressure Father     Other Father     Cancer Maternal Grandmother      Social History     Tobacco Use    Smoking status: Every Day     Packs/day: 1.00 Years: 23.00     Pack years: 23.00     Types: Cigarettes     Start date: 5/29/1991    Smokeless tobacco: Former   Substance Use Topics    Alcohol use: No     Comment: Sober since 2012  caffeine 2 cups coffee, 3-4 bottles soda daily. Review of Systems:   Constitutional: No Fever or Weight Loss   Eyes: No Decreased Vision  ENT: No Headaches, Hearing Loss or Vertigo  Cardiovascular: as per note above   Respiratory: No cough or wheezing and as per note above. Gastrointestinal: No abdominal pain, appetite loss, blood in stools, constipation, diarrhea or heartburn  Genitourinary: No dysuria, trouble voiding, or hematuria  Musculoskeletal:  denies any new  joint aches , swelling  or pain   Integumentary: No rash or pruritis  Neurological: No TIA or stroke symptoms  Psychiatric: No anxiety or depression  Endocrine: No malaise, fatigue or temperature intolerance  Hematologic/Lymphatic: No bleeding problems, blood clots or swollen lymph nodes  Allergic/Immunologic: No nasal congestion or hives    Objective:      Physical Exam:  /78 (Site: Left Upper Arm, Position: Sitting, Cuff Size: Medium Adult)   Pulse 72   Ht 5' 7\" (1.702 m)   Wt 171 lb 6.4 oz (77.7 kg)   SpO2 98%   BMI 26.85 kg/m²   Wt Readings from Last 3 Encounters:   12/06/22 171 lb 6.4 oz (77.7 kg)   11/17/22 165 lb 14.4 oz (75.3 kg)   11/11/22 167 lb (75.8 kg)     Body mass index is 26.85 kg/m². Vitals:    12/06/22 1159   BP: 118/78   Pulse: 72   SpO2: 98%        General Appearance:  No distress, conversant  Constitutional:  Well developed, Well nourished, No acute distress, Non-toxic appearance. HENT:  Normocephalic, Atraumatic, Bilateral external ears normal, Oropharynx moist, No oral exudates, Nose normal. Neck- Normal range of motion, No tenderness, Supple, No stridor,no apical-carotid delay  Eyes:  PERRL, EOMI, Conjunctiva normal, No discharge.    Respiratory:  Normal breath sounds, No respiratory distress, No wheezing, No chest tenderness. ,no use of accessory muscles, NO crackles  Cardiovascular: (PMI) apex non displaced,no lifts no thrills,/S1 and S2 audible, No added heart sounds, No signs of ankle edema, or volume overload, No evidence of JVD, No crackles   GI:  Bowel sounds normal, Soft, No tenderness, No masses, No gross visceromegaly   :  No costovertebral angle tenderness   Musculoskeletal:  No edema, no tenderness, no deformities. Back- no tenderness  Integument:  Well hydrated, no rash   Lymphatic:  No lymphadenopathy noted   Neurologic:  Alert & oriented x 3, CN 2-12 normal, normal motor function, normal sensory function, no focal deficits noted   Psychiatric:  Speech and behavior appropriate       Medical decision making and Data review:  DATA:  Lab Results   Component Value Date    TROPONINT <0.010 10/20/2015     BNP:    Lab Results   Component Value Date    PROBNP 24.27 10/20/2015     PT/INR:  No results found for: PTINR  No results found for: LABA1C  Lab Results   Component Value Date    CHOL 201 (H) 09/28/2022    TRIG 100 09/28/2022    HDL 42 09/28/2022    LDLCALC 139 (H) 09/28/2022     Lab Results   Component Value Date    ALT 18 10/07/2022    AST 18 10/07/2022     No results for input(s): WBC, HGB, HCT, MCV, PLT in the last 72 hours. TSH: No results found for: TSH  Lab Results   Component Value Date    AST 18 10/07/2022    ALT 18 10/07/2022    BILIDIR 0.2 02/11/2021    BILITOT <0.2 10/07/2022    ALKPHOS 68 10/07/2022     Lab Results   Component Value Date    PROBNP 24.27 10/20/2015     No results found for: LABA1C  Lab Results   Component Value Date    WBC 13.3 (H) 02/11/2021    HGB 16.5 02/11/2021    HCT 47.7 02/11/2021     02/11/2021     All labs, medications and tests reviewed by myself including data and history from outside source , patient and available family . Assessment & Plan:      1. Dyslipidemia    2. Smoker    3. TIA (transient ischemic attack)    4. Tobacco use disorder    5.  Venous insufficiency of right leg    6. Abnormal echocardiogram         TIA (transient ischemic attack)   On imaging he has not had any strokes but he had 2 episodes which are concerning for possible TIA he still waiting to see neurology continue aspirin. Increase Lipitor to 40 mg daily since his LDL is still in 120s. We talked about PFO and indication for closing of PFO he does not have any DVT. Get MRI brain to rule out or see documentation of any strokes? Will debate PFO closure only if there are no other etiologies for stroke or hypercoagulable status encouraged to quit smoking  He had lengthy discussion including indication of PFO closure and explanation for cryptogenic stroke although we do not have any evidence of stroke and most of the symptoms are TIA or could be atypical migraine? Or even seizures hence neurology evaluation is important  I will see him after neurology evaluation and MRI brain and plan PFO closure only if there is no other indication as explained above  Follow-up with Dr. Yas Moore as routine    Smoker   EnCourage to quit smoking    Dyslipidemia        Dyslipidemia :  All available lab work was reviewed. Patient was advised to repeat lab work before next visit. Necessary orders were placed , instructions given by myself       Counseled extensively and medication compliance urged. We discussed that for the  prevention of ASCVD our  goal is aggressive risk modification. Patient is encouraged to exercise if they can , educated about  brisk walk for 30 minutes  at least 3 to 4 times a week if there are no physical limitations  Various goals were discussed and questions answered. Continue current medications. Appropriate prescriptions are addressed and refills ordered. Questions answered and patient verbalizes understanding. Call for any problems, questions, or concerns. Greater than 60 % of time spent counseling besides reviewing data and images     Continue all other medications of all above medical condition listed as is. Return in about 2 months (around 2/6/2023). Please note this report has been partially produced using speech recognition software and may contain errors related to that system including errors in grammar, punctuation, and spelling, as well as words and phrases that may be inappropriate. If there are any questions or concerns please feel free to contact the dictating provider for clarification.

## 2022-12-06 NOTE — ASSESSMENT & PLAN NOTE
On imaging he has not had any strokes but he had 2 episodes which are concerning for possible TIA he still waiting to see neurology continue aspirin. Increase Lipitor to 40 mg daily since his LDL is still in 120s. We talked about PFO and indication for closing of PFO he does not have any DVT. Get MRI brain to rule out or see documentation of any strokes? Will debate PFO closure only if there are no other etiologies for stroke or hypercoagulable status encouraged to quit smoking  He had lengthy discussion including indication of PFO closure and explanation for cryptogenic stroke although we do not have any evidence of stroke and most of the symptoms are TIA or could be atypical migraine?   Or even seizures hence neurology evaluation is important

## 2022-12-06 NOTE — PATIENT INSTRUCTIONS
Thank you for allowing us to care for you today! We want to ensure we can follow your treatment plan and we strive to give you the best outcomes and experience possible. If you ever have a life threatening emergency and call 911 - for an ambulance (EMS)   Our providers can only care for you at:   [facility] or [facility]. Even if you have someone take you or you drive yourself we can only care for you in a [facility]. Our providers are not setup at the other healthcare locations! Please be informed that if you contact our office outside of normal business hours the physician on call cannot help with any scheduling or rescheduling issues, procedure instruction questions or any type of medication issue. We advise you for any urgent/emergency that you go to the nearest emergency room! PLEASE CALL OUR OFFICE DURING NORMAL BUSINESS HOURS    Monday - Friday   8 am to 5 pm    [place] [address]: [phone]    [place]:  [phone]  **It is YOUR responsibilty to bring medication bottles and/or updated medication list to [address] [place] [address]. This will allow us to better serve you and all your healthcare needs**  [facility] Locations - No appointment necessary. Sites open Monday to Friday. Call your preferred location for test preparation, business hours and other information you need. [employer] accepts [employer]. [facility]   [facility] [address] Peter Patel, [address] [facility]  Phone: [phone] [facility] [address]  [address].   Fariha Quinteros  Phone: [phone]

## 2022-12-12 ENCOUNTER — OFFICE VISIT (OUTPATIENT)
Dept: INTERNAL MEDICINE CLINIC | Age: 47
End: 2022-12-12
Payer: COMMERCIAL

## 2022-12-12 VITALS
OXYGEN SATURATION: 99 % | SYSTOLIC BLOOD PRESSURE: 120 MMHG | HEART RATE: 65 BPM | RESPIRATION RATE: 16 BRPM | HEIGHT: 67 IN | BODY MASS INDEX: 26.46 KG/M2 | DIASTOLIC BLOOD PRESSURE: 78 MMHG | TEMPERATURE: 98.1 F | WEIGHT: 168.6 LBS

## 2022-12-12 DIAGNOSIS — E78.5 DYSLIPIDEMIA: ICD-10-CM

## 2022-12-12 DIAGNOSIS — I87.2 VENOUS INSUFFICIENCY OF RIGHT LEG: ICD-10-CM

## 2022-12-12 DIAGNOSIS — F17.200 TOBACCO USE DISORDER: ICD-10-CM

## 2022-12-12 DIAGNOSIS — G45.9 TIA (TRANSIENT ISCHEMIC ATTACK): Primary | ICD-10-CM

## 2022-12-12 PROBLEM — R19.09 RIGHT GROIN MASS: Status: RESOLVED | Noted: 2022-11-04 | Resolved: 2022-12-12

## 2022-12-12 PROCEDURE — 99213 OFFICE O/P EST LOW 20 MIN: CPT | Performed by: INTERNAL MEDICINE

## 2022-12-12 NOTE — PROGRESS NOTES
Vivien Cooper  Patient's  is 1975  Seen in office on 2022      SUBJECTIVE:  Thomas Khalil maricruz 50 y. o.year old male presents today   Chief Complaint   Patient presents with    Follow-up     6 week f/u for TIA and completion of RTW paperwork     Pt states he is doing well  He saw Dr Jn De La Cruz and he increased atorvastatin to 40 mg daily  Pt states he is doing well  No falls. No injury  No chest pain. No SOB. No HA  No syncope or near syncope . History of TIA x2.  CT scan of the brain was negative in the past.  No source of TIA was found. Patient did have PFO but had no DVT. Patient has appointment with neurologist in 3 days. No nodes in the groin : On CT abdomen. Seen Dr Facundo Randolph : no nodes . Patient states he is feeling well and wants to go back to work. Denies any headaches. No dizziness no syncope or near syncope. Denies any weakness of the arms or legs. He is ambulatory. Good strength in both arms        Review of Systems  Review of system normal except as in HPI  OBJECTIVE: /78 (Site: Left Upper Arm, Position: Sitting, Cuff Size: Medium Adult)   Pulse 65   Temp 98.1 °F (36.7 °C) (Temporal)   Resp 16   Ht 5' 7\" (1.702 m)   Wt 168 lb 9.6 oz (76.5 kg)   SpO2 99%   BMI 26.41 kg/m²     Wt Readings from Last 3 Encounters:   22 168 lb 9.6 oz (76.5 kg)   22 171 lb 6.4 oz (77.7 kg)   22 165 lb 14.4 oz (75.3 kg)      GENERAL: - Alert, oriented, pleasant, in no apparent distress. HEENT: - Conjunctiva pink, no scleral icterus. ENT clear. NECK: -Supple. No jugular venous distention noted. No masses felt,  CARDIOVASCULAR: - Normal S1 and S2    PULMONARY: - No respiratory distress. No wheezes or rales. ABDOMEN: - Soft and non-tender,no masses  ororganomegaly. EXTREMITIES: - No cyanosis, clubbing, or significant edema. SKIN: Skin is warm and dry. NEUROLOGICAL: - Cranial nerves II through XII are grossly intact. Patient is awake alert and oriented.   Has good strength. Ambulatory. No focal weakness    IMPRESSION:    Encounter Diagnoses   Name Primary? TIA (transient ischemic attack) Yes    Dyslipidemia     Tobacco use disorder     Venous insufficiency of right leg        ASSESSMENT/PLAN:    1. TIA (transient ischemic attack)  Overview:  2 episode of TIA like symptoms  CT brain is negative   Echo and ALEXUS : PFO  No DVT    MRI brain ordered by Dr. Jesus Stanley  Has appointment neurologist on 12/14/22  Assessment & Plan:  As above  Patient has no focal weakness. We will release him back to work. 2. Dyslipidemia  Overview: On atorvastatin   Assessment & Plan:  Follow low-cholesterol diet and atorvastatin    3. Tobacco use disorder   Advised patient to quit smoking. Options given    4. Venous insufficiency of right leg  Overview:  Ultrasound of the leg showed venous insufficiency  Compression stockings    Will release patient back to work    Return to office in 2 months      Mediations reviewed with the patient. Continue current medications. Appropriate prescriptions are addressed. After visit summeryprovided. Follow up as directed sooner if needed. Questions answered and patient verbalizes understanding. Call for any problems, questions, or concerns. Allergies   Allergen Reactions    Clindamycin/Lincomycin     Penicillins Other (See Comments)     Unknown - allergy as child     Current Outpatient Medications   Medication Sig Dispense Refill    atorvastatin (LIPITOR) 40 MG tablet Take 1 tablet by mouth daily 90 tablet 4    aspirin 81 MG tablet Take 81 mg by mouth daily       No current facility-administered medications for this visit.      Past Medical History:   Diagnosis Date    Broken bones     collar bone left sude and left hand    Bronchitis     Pneumonia     Smoker     TIA (transient ischemic attack)      Past Surgical History:   Procedure Laterality Date    DENTAL SURGERY       Social History     Tobacco Use    Smoking status: Every Day     Packs/day: 1.00 Years: 23.00     Pack years: 23.00     Types: Cigarettes     Start date: 5/29/1991    Smokeless tobacco: Former   Substance Use Topics    Alcohol use: No     Comment: Sober since 2012  caffeine 2 cups coffee, 3-4 bottles soda daily.        LAB REVIEW:  CBC:   Lab Results   Component Value Date/Time    WBC 13.3 02/11/2021 01:04 AM    HGB 16.5 02/11/2021 01:04 AM    HCT 47.7 02/11/2021 01:04 AM     02/11/2021 01:04 AM     Lipids:   Lab Results   Component Value Date    HDL 42 09/28/2022    LDLCALC 139 (H) 09/28/2022     Renal:   Lab Results   Component Value Date/Time    BUN 6 10/07/2022 10:13 AM    CREATININE 0.8 10/07/2022 10:13 AM     10/07/2022 10:13 AM    K 4.1 10/07/2022 10:13 AM    ALT 18 10/07/2022 10:13 AM    AST 18 10/07/2022 10:13 AM    GLUCOSE 87 10/07/2022 10:13 AM    GLUF 77 10/20/2015 01:53 PM     PT/INR: No results found for: INR  A1C: No results found for: Jackie Polanco MD, 12/12/2022 , 4:25 PM

## 2022-12-14 ENCOUNTER — OFFICE VISIT (OUTPATIENT)
Dept: NEUROLOGY | Age: 47
End: 2022-12-14
Payer: COMMERCIAL

## 2022-12-14 VITALS
WEIGHT: 167 LBS | OXYGEN SATURATION: 98 % | BODY MASS INDEX: 26.21 KG/M2 | HEIGHT: 67 IN | DIASTOLIC BLOOD PRESSURE: 80 MMHG | SYSTOLIC BLOOD PRESSURE: 130 MMHG | HEART RATE: 96 BPM

## 2022-12-14 DIAGNOSIS — R29.818 TRANSIENT NEUROLOGICAL SYMPTOMS: ICD-10-CM

## 2022-12-14 DIAGNOSIS — R29.898 LEFT ARM WEAKNESS: Primary | ICD-10-CM

## 2022-12-14 PROCEDURE — 99205 OFFICE O/P NEW HI 60 MIN: CPT | Performed by: STUDENT IN AN ORGANIZED HEALTH CARE EDUCATION/TRAINING PROGRAM

## 2022-12-14 NOTE — PROGRESS NOTES
12/14/22    Jeanne Natalieshen  1975    Chief Complaint   Patient presents with    New Patient     Left sided weakness. TIA       Neurologic Consult Note    Subjective    History of Present Illness  Darrin Bess is a 50 y.o. male presenting today for neurologic evaluation of transient episode of left-sided weakness. Darrin Bess tells me that he has had 2 episodes of transient neurologic symptoms. He tells me that on August 29th he started to feel light headed and then he recalls waking on the floor with his coworkers around him. The left arm he describes be \"clenched\". He also had left arm numbness and possible left facial droop with slurred speech. He states the facial droop was short-lived but that the left arm remained weak for approximately 5 days. He was not profoundly weak but states that he could definitely notice a difference between his left and right arm. He tells me with this episode he went to [facility] [address] They told him he had a TIA. He had another near identical episode a couple weeks later. With the second episode his weakness only lasted a couple hours. For both episodes his heart rate was in the 40s. He does recall tunneling in of his vision. No clear palpitations. He tells me that he does have a \"abnormal heart beat\". There was no loss of bowel or bladder with these episodes. He does describes feeling confused for approximately 10-15 minutes. No tongue biting. Admits to olfactory hallucinations. Denies gustatory hallucinations. Denies personal or family history of seizure disorder. Is been following with cardiology who have completed a 2D echo and a ALEXUS which demonstrates a PFO. He did not have a DVT so paradoxical emboli felt to be unlikely by them. he tells me that he had a holter monitor as well. He is uncertain the results of this. He has an MRI brain without contrast scheduled for December 19. He is currently on baby aspirin and Lipitor 40 mg.     He does have family history of stroke and diabetes. Review of Symptoms:  Neurologic   Symptoms: no difficulty with gait or walking, no bowel symptoms, no vertigo, no confusion, no memory loss, no speech disorder, no visual loss, no double vision, no dizziness, no loss of hearing, no sensory disturbances, no weakness, no headaches, no bladder symptoms, no seizures, no excessive fatigue, and no syncope    Current Outpatient Medications   Medication Sig Dispense Refill    atorvastatin (LIPITOR) 40 MG tablet Take 1 tablet by mouth daily 90 tablet 4    aspirin 81 MG tablet Take 81 mg by mouth daily       No current facility-administered medications for this visit. Past Medical History:   Diagnosis Date    Broken bones     collar bone left sude and left hand    Bronchitis     Pneumonia     Smoker     TIA (transient ischemic attack)        Past Surgical History:   Procedure Laterality Date    DENTAL SURGERY          Social History     Socioeconomic History    Marital status: Single   Tobacco Use    Smoking status: Every Day     Packs/day: 1.00     Years: 23.00     Pack years: 23.00     Types: Cigarettes     Start date: 5/29/1991    Smokeless tobacco: Former   Vaping Use    Vaping Use: Never used   Substance and Sexual Activity    Alcohol use: No     Comment: Sober since 2012  caffeine 2 cups coffee, 3-4 bottles soda daily. Drug use: Not Currently     Types: Marijuana [employer])     Comment: Every few months       Family History   Problem Relation Age of Onset    High Blood Pressure Mother     Other Mother     Kidney Disease Mother     Cancer Father     High Blood Pressure Father     Other Father     Cancer Maternal Grandmother        Objective    Physical Exam:  Also present during visit:  None .     Constitutional   Weight: well nourished  Heart/Vascular   No cyanosis or clubbing appreciated  Neck   Appearance/Palpation/Auscultation: supple  Mental Status   Orientation: oriented to person, oriented to place, oriented to problem, and oriented to time   Mood/Affect: appropriate mood and appropriate affect   Memory/Other: recent memory intact, remote memory intact, fund of knowledge intact, attention span normal, and concentration normal  Language   Language: (normal) language, no dysarthria, (normal) articulation, and no dysphasia/aphasia  Cranial Nerves   CN II Right: visual fields appear intact   CN II Left: visual fields appear intact   CN III, IV, VI: EOM no nystagmus, normal pursuit, and extraocular muscle strength normal   CN III: pupil normal size, pupil reactive to light and dark, pupil accomodates, and no ptosis   CN IV: normal   CN VI: normal   CN V Right: normal sensation and muscles of mastication intact   CN V Left: normal sensation and muscles of mastication intact   CN VII Right: normal facial expression   CN VII Left: normal facial expression   CN VIII Right: hearing in tact to normal conversation   CN VIII Left: hearing in tact to normal conversation   CN IX,X: normal palatal movement   CN XI Right: normal sternocleidomastoid and normal trapezius   CN XI Left: normal sternocleidomastoid and normal trapezius   CN XII: no tremors of the tongue, no fasciculation of the tongue, tongue protrudes midline, normal power to left, and normal power to right  Gait and Stance   Gait/Posture: station normal, ambulates independently, and gait normal  Motor/Coordination Exam   General: no bradykinesia, no tremors, no chorea, no athetosis, no myoclonus, and no dyskinesia   Right Upper Extremity: normal motor strength, normal bulk, and normal tone   Left Upper Extremity: normal motor strength, normal bulk, and normal tone   Right Lower Extremity: normal motor strength, normal bulk, and normal tone   Left Lower Extremity: normal motor strength, normal bulk, and normal tone   Coordination: no drift, normal finger-to-nose, normal heel-to-shin, and rapid alternating movements normal  Reflexes   Reflexes Right: 2/4 biceps, brachioradialis, patellar, and achilles    Reflexes Left: 2/4 biceps, brachioradialis, patellar, and achilles    Plantar Reflex Right: response downgoing   Plantar Reflex Left: response downgoing   Hoffmans Reflex Right: absent   Hoffmans Reflex Left: absent   Frontal Release Signs: frontal release signs absent  Sensory   Sensation: normal light touch, normal pinprick, normal temperature, normal vibration, normal DSS, and no neglect    Lungs   No auditory wheezing  Skin   Inspection: no jaundice, no lesions, no rashes, and no cyanosis      /80 (Site: Left Upper Arm, Position: Sitting, Cuff Size: Medium Adult)   Pulse 96   Ht 5' 7\" (1.702 m)   Wt 167 lb (75.8 kg)   SpO2 98%   BMI 26.16 kg/m²     Assessment and Plan     Diagnosis Orders   1. Left arm weakness  MRI BRAIN W WO CONTRAST      2. Transient neurological symptoms  MRI BRAIN W WO CONTRAST    EEG    EEG 48 Hour Ambulatory        Lorenzo Charles was seen today in neurologic consultation for syncope and left arm weakness. After taking a detailed history from him, he describes what sounds most like cardiogenic syncope but upon awakening had left hemiparesis concerning for stroke at the time of syncopal episode. I would like to begin work-up by obtaining an MRI brain which I was pleased to hear he is already scheduled for on Monday. I am going to add a contrasted study to this and my office will call over to confirm that they can accommodate. I discussed with him that I am concerned given his history of bradycardia and palpitations that he may have gone into a cardiac arrhythmia, syncopized and possibly had a cardioembolic stroke. MRI brain will help to further delineate this. As he describes having a repeated event approximately 1 to 2 weeks later, I also want to rule out seizure as etiology, hence my adding a contrasted MRI brain. I will also be obtaining a routine followed by an ambulatory EEG.   Ultimately, if his MRI brain is unremarkable and EEGs are normal, I will not have a neurologic etiology for his symptoms as his symptoms did last too long (5 days) to be considered a TIA. Certainly, I will have further recommendations following completion of his workup. I would like for him to continue being evaluated by cardiology. While he tells me he has had a Holter monitor completed, I do not see results of this. If his MRI brain does ultimately demonstrate a stroke, we may consider further vascular imaging. He will continue on baby aspirin and statin at present time. I did discuss tobacco cessation with him today. Thank you for allowing us to participate in the care of your patient. Please do not hesitate to contact us with questions. Return in about 3 months (around 3/14/2023) for follow up with MICHELE.     Tigre Rojas, DO

## 2022-12-27 ENCOUNTER — HOSPITAL ENCOUNTER (OUTPATIENT)
Dept: SLEEP CENTER | Age: 47
Discharge: HOME | End: 2022-12-27
Payer: COMMERCIAL

## 2022-12-27 DIAGNOSIS — R29.818 TRANSIENT NEUROLOGICAL SYMPTOMS: ICD-10-CM

## 2022-12-27 PROCEDURE — 95819 EEG AWAKE AND ASLEEP: CPT

## 2022-12-27 PROCEDURE — 95816 EEG AWAKE AND DROWSY: CPT | Performed by: STUDENT IN AN ORGANIZED HEALTH CARE EDUCATION/TRAINING PROGRAM

## 2022-12-28 ENCOUNTER — HOSPITAL ENCOUNTER (OUTPATIENT)
Dept: MRI IMAGING | Age: 47
Discharge: HOME | End: 2022-12-28
Payer: COMMERCIAL

## 2022-12-28 DIAGNOSIS — R29.818 TRANSIENT NEUROLOGICAL SYMPTOMS: ICD-10-CM

## 2022-12-28 DIAGNOSIS — R29.898 LEFT ARM WEAKNESS: ICD-10-CM

## 2022-12-28 PROCEDURE — A9579 GAD-BASE MR CONTRAST NOS,1ML: HCPCS | Performed by: STUDENT IN AN ORGANIZED HEALTH CARE EDUCATION/TRAINING PROGRAM

## 2022-12-28 PROCEDURE — 6360000004 HC RX CONTRAST MEDICATION: Performed by: STUDENT IN AN ORGANIZED HEALTH CARE EDUCATION/TRAINING PROGRAM

## 2022-12-28 PROCEDURE — 70553 MRI BRAIN STEM W/O & W/DYE: CPT

## 2022-12-28 RX ADMIN — GADOTERIDOL 15 ML: 279.3 INJECTION, SOLUTION INTRAVENOUS at 14:04

## 2022-12-28 NOTE — PROCEDURES
ROUTINE ELECTROENCEPHALOGRAM    Identifying Information:  Name: Stephanie Clark  MRN: [id number]  : 1975  Interpreting Physician: Mariaelena Andrew DO  Referring Provider: Alex Hinds DO  Date of EE22  Procedure Location: Outpatient     Clinical History:  Stephanie Clark is a 50 y.o. male with concerns for seizure like activity. Current Medications:   Current Outpatient Medications   Medication Instructions    aspirin 81 mg, Oral, DAILY    atorvastatin (LIPITOR) 40 mg, Oral, DAILY     Indication:  Rule out seizure/seizure disorder     Technical Summary:  28 channels of EEG were recorded in a digital format on a patient who was reported to be awake and drowsy during the recording. The patient was not sleep deprived prior to the EEG. The PDR consisted of well-developed, well-regulated 9-10 Hz alpha activity, maximal over the posterior head regions and reactive to eye opening and closure. Photic stimulation was performed and did not produce any abnormalities. During the recording stage II sleep was not seen, but drowsiness did occur. The EKG lead revealed no rhythm abnormalities. EEG Interpretation: This EEG was within normal limits for a patient of this age in the awake and drowsy states. No focal, lateralizing, or epileptiform features were seen during the recording. Clinical correlation is recommended.     Mariaelena Andrew DO  Epileptologist  2022 12:15 AM

## 2023-09-12 ENCOUNTER — COMMUNITY OUTREACH (OUTPATIENT)
Dept: INTERNAL MEDICINE CLINIC | Age: 48
End: 2023-09-12

## 2023-11-15 PROCEDURE — 99283 EMERGENCY DEPT VISIT LOW MDM: CPT

## 2023-11-16 ENCOUNTER — HOSPITAL ENCOUNTER (EMERGENCY)
Age: 48
Discharge: HOME | End: 2023-11-16
Attending: EMERGENCY MEDICINE

## 2023-11-16 VITALS
WEIGHT: 165 LBS | BODY MASS INDEX: 25.01 KG/M2 | TEMPERATURE: 98 F | HEART RATE: 56 BPM | SYSTOLIC BLOOD PRESSURE: 133 MMHG | HEIGHT: 68 IN | DIASTOLIC BLOOD PRESSURE: 82 MMHG | OXYGEN SATURATION: 98 % | RESPIRATION RATE: 16 BRPM

## 2023-11-16 DIAGNOSIS — M54.32 SCIATICA OF LEFT SIDE: Primary | ICD-10-CM

## 2023-11-16 DIAGNOSIS — S39.012A STRAIN OF LUMBAR REGION, INITIAL ENCOUNTER: ICD-10-CM

## 2023-11-16 PROCEDURE — 6360000002 HC RX W HCPCS: Performed by: EMERGENCY MEDICINE

## 2023-11-16 PROCEDURE — 6370000000 HC RX 637 (ALT 250 FOR IP): Performed by: EMERGENCY MEDICINE

## 2023-11-16 RX ORDER — CYCLOBENZAPRINE HCL 10 MG
10 TABLET ORAL ONCE
Status: COMPLETED | OUTPATIENT
Start: 2023-11-16 | End: 2023-11-16

## 2023-11-16 RX ORDER — GABAPENTIN 300 MG/1
300 CAPSULE ORAL 2 TIMES DAILY
Qty: 28 CAPSULE | Refills: 0 | Status: SHIPPED | OUTPATIENT
Start: 2023-11-16 | End: 2023-11-30

## 2023-11-16 RX ORDER — CYCLOBENZAPRINE HCL 10 MG
10 TABLET ORAL 3 TIMES DAILY PRN
Qty: 21 TABLET | Refills: 0 | Status: SHIPPED | OUTPATIENT
Start: 2023-11-16 | End: 2023-11-26

## 2023-11-16 RX ORDER — DEXAMETHASONE 4 MG/1
8 TABLET ORAL ONCE
Status: COMPLETED | OUTPATIENT
Start: 2023-11-16 | End: 2023-11-16

## 2023-11-16 RX ORDER — DEXAMETHASONE 4 MG/1
4 TABLET ORAL
Qty: 5 TABLET | Refills: 0 | Status: SHIPPED | OUTPATIENT
Start: 2023-11-16 | End: 2023-11-21

## 2023-11-16 RX ORDER — GABAPENTIN 300 MG/1
300 CAPSULE ORAL ONCE
Status: COMPLETED | OUTPATIENT
Start: 2023-11-16 | End: 2023-11-16

## 2023-11-16 RX ADMIN — CYCLOBENZAPRINE 10 MG: 10 TABLET, FILM COATED ORAL at 00:20

## 2023-11-16 RX ADMIN — DEXAMETHASONE 8 MG: 4 TABLET ORAL at 00:20

## 2023-11-16 RX ADMIN — GABAPENTIN 300 MG: 300 CAPSULE ORAL at 00:20

## 2023-11-16 ASSESSMENT — PAIN DESCRIPTION - ORIENTATION: ORIENTATION: LEFT

## 2023-11-16 ASSESSMENT — PAIN SCALES - GENERAL: PAINLEVEL_OUTOF10: 8

## 2023-11-16 ASSESSMENT — PAIN - FUNCTIONAL ASSESSMENT: PAIN_FUNCTIONAL_ASSESSMENT: 0-10

## 2023-11-16 ASSESSMENT — PAIN DESCRIPTION - LOCATION: LOCATION: BACK

## 2023-11-16 ASSESSMENT — PAIN DESCRIPTION - PAIN TYPE: TYPE: ACUTE PAIN

## 2023-11-16 NOTE — ED NOTES
Patient discharge with instructions. Pt verbalized understanding.         Radha Tripathi RN  11/16/23 [id number]

## 2024-09-04 ENCOUNTER — HOSPITAL ENCOUNTER (EMERGENCY)
Age: 49
Discharge: HOME | End: 2024-09-04
Attending: EMERGENCY MEDICINE

## 2024-09-04 VITALS
WEIGHT: 150.7 LBS | OXYGEN SATURATION: 99 % | RESPIRATION RATE: 18 BRPM | HEIGHT: 68 IN | SYSTOLIC BLOOD PRESSURE: 152 MMHG | DIASTOLIC BLOOD PRESSURE: 62 MMHG | BODY MASS INDEX: 22.84 KG/M2 | TEMPERATURE: 98.6 F | HEART RATE: 63 BPM

## 2024-09-04 DIAGNOSIS — G89.29 ACUTE EXACERBATION OF CHRONIC LOW BACK PAIN: Primary | ICD-10-CM

## 2024-09-04 DIAGNOSIS — M54.50 ACUTE EXACERBATION OF CHRONIC LOW BACK PAIN: Primary | ICD-10-CM

## 2024-09-04 PROCEDURE — 99284 EMERGENCY DEPT VISIT MOD MDM: CPT

## 2024-09-04 PROCEDURE — 6370000000 HC RX 637 (ALT 250 FOR IP): Performed by: EMERGENCY MEDICINE

## 2024-09-04 PROCEDURE — 96372 THER/PROPH/DIAG INJ SC/IM: CPT

## 2024-09-04 PROCEDURE — 6360000002 HC RX W HCPCS: Performed by: EMERGENCY MEDICINE

## 2024-09-04 RX ORDER — LIDOCAINE 4 G/G
1 PATCH TOPICAL DAILY
Qty: 30 PATCH | Refills: 0 | Status: SHIPPED | OUTPATIENT
Start: 2024-09-04 | End: 2024-10-04

## 2024-09-04 RX ORDER — DEXAMETHASONE SODIUM PHOSPHATE 4 MG/ML
8 INJECTION, SOLUTION INTRA-ARTICULAR; INTRALESIONAL; INTRAMUSCULAR; INTRAVENOUS; SOFT TISSUE ONCE
Status: COMPLETED | OUTPATIENT
Start: 2024-09-04 | End: 2024-09-04

## 2024-09-04 RX ORDER — KETOROLAC TROMETHAMINE 10 MG/1
10 TABLET, FILM COATED ORAL EVERY 6 HOURS PRN
Qty: 20 TABLET | Refills: 0 | Status: SHIPPED | OUTPATIENT
Start: 2024-09-04 | End: 2024-09-09

## 2024-09-04 RX ORDER — DIAZEPAM 5 MG
5 TABLET ORAL ONCE
Status: COMPLETED | OUTPATIENT
Start: 2024-09-04 | End: 2024-09-04

## 2024-09-04 RX ORDER — KETOROLAC TROMETHAMINE 30 MG/ML
30 INJECTION, SOLUTION INTRAMUSCULAR; INTRAVENOUS ONCE
Status: COMPLETED | OUTPATIENT
Start: 2024-09-04 | End: 2024-09-04

## 2024-09-04 RX ORDER — ORPHENADRINE CITRATE 100 MG/1
100 TABLET, EXTENDED RELEASE ORAL 2 TIMES DAILY PRN
Qty: 14 TABLET | Refills: 0 | Status: SHIPPED | OUTPATIENT
Start: 2024-09-04 | End: 2024-09-11

## 2024-09-04 RX ADMIN — KETOROLAC TROMETHAMINE 30 MG: 30 INJECTION, SOLUTION INTRAMUSCULAR; INTRAVENOUS at 00:39

## 2024-09-04 RX ADMIN — DEXAMETHASONE SODIUM PHOSPHATE 8 MG: 4 INJECTION, SOLUTION INTRAMUSCULAR; INTRAVENOUS at 00:38

## 2024-09-04 RX ADMIN — DIAZEPAM 5 MG: 5 TABLET ORAL at 00:39

## 2024-09-04 ASSESSMENT — PAIN DESCRIPTION - PAIN TYPE: TYPE: ACUTE PAIN

## 2024-09-04 ASSESSMENT — LIFESTYLE VARIABLES
HOW OFTEN DO YOU HAVE A DRINK CONTAINING ALCOHOL: NEVER
HOW MANY STANDARD DRINKS CONTAINING ALCOHOL DO YOU HAVE ON A TYPICAL DAY: PATIENT DOES NOT DRINK

## 2024-09-04 ASSESSMENT — PAIN - FUNCTIONAL ASSESSMENT: PAIN_FUNCTIONAL_ASSESSMENT: 0-10

## 2024-09-04 ASSESSMENT — PAIN DESCRIPTION - LOCATION: LOCATION: BACK

## 2024-09-04 ASSESSMENT — PAIN SCALES - GENERAL: PAINLEVEL_OUTOF10: 8

## 2024-09-04 ASSESSMENT — PAIN DESCRIPTION - ORIENTATION: ORIENTATION: LOWER

## 2024-09-04 NOTE — ED PROVIDER NOTES
Triage Chief Complaint:    Back Pain (Pt having low back pain. Pt stated he felt a \"pop\" in his back tonight )    HPI   Reji Ellis is a 49 y.o. male that presents for evaluation of low back pain.  He states he felt a pop all of a sudden had paresthesias down his right leg and states that he gets these worsening paresthesias to the point its almost feeling numb when he lifts his leg up or moves around.  He states when he is laying he has no symptoms including no pain.  He is not having any pain in his abdomen.  No urinary symptoms.  No saddle anesthesia.  He still to move his leg while he is resting but he states that anytime he tries to lift it up off the bed or tries to ambulate he has pretty significant pain in his lower back going down his right leg.  He had the same issue in the left leg but it was slightly different as that was more constant and this seems to be only when he is active.  He has not followed up with spine as previously directed.  He denies any medical problems that he is currently taking any medication for.  He does not have a primary care doctor with whom he follows.  He did not take anything for symptom management.  He felt as though he could not going to work today and so that is why he came here for further evaluation.    Denies DM, CKD, night pain, alcohol use disorder, IVDA, fever, immunosuppression, urinary incontinence/retention.  Has not had recent procedure/fracture, recent infection, and does not have urinary catheter.    History from : Patient    Limitations to history : None    ROS:  10 systems reviewed and negative except as above.     Past Medical History:   Diagnosis Date    Broken bones     collar bone left sude and left hand    Bronchitis     Pneumonia     Smoker     TIA (transient ischemic attack)      Past Surgical History:   Procedure Laterality Date    DENTAL SURGERY       Family History   Problem Relation Age of Onset    High Blood Pressure Mother     Other Mother

## 2025-04-04 ENCOUNTER — HOSPITAL ENCOUNTER (EMERGENCY)
Age: 50
Discharge: HOME | End: 2025-04-04
Attending: EMERGENCY MEDICINE

## 2025-04-04 VITALS
SYSTOLIC BLOOD PRESSURE: 125 MMHG | WEIGHT: 145 LBS | TEMPERATURE: 97.9 F | HEART RATE: 66 BPM | HEIGHT: 68 IN | RESPIRATION RATE: 16 BRPM | OXYGEN SATURATION: 99 % | BODY MASS INDEX: 21.98 KG/M2 | DIASTOLIC BLOOD PRESSURE: 74 MMHG

## 2025-04-04 DIAGNOSIS — J01.00 ACUTE NON-RECURRENT MAXILLARY SINUSITIS: Primary | ICD-10-CM

## 2025-04-04 PROCEDURE — 99283 EMERGENCY DEPT VISIT LOW MDM: CPT

## 2025-04-04 RX ORDER — FLUTICASONE PROPIONATE 50 MCG
1 SPRAY, SUSPENSION (ML) NASAL DAILY
Qty: 32 G | Refills: 0 | Status: SHIPPED | OUTPATIENT
Start: 2025-04-04

## 2025-04-04 RX ORDER — PSEUDOEPHEDRINE HCL 120 MG/1
120 TABLET, FILM COATED, EXTENDED RELEASE ORAL EVERY 12 HOURS
Qty: 20 TABLET | Refills: 0 | Status: SHIPPED | OUTPATIENT
Start: 2025-04-04 | End: 2025-04-14

## 2025-04-04 ASSESSMENT — PAIN - FUNCTIONAL ASSESSMENT: PAIN_FUNCTIONAL_ASSESSMENT: NONE - DENIES PAIN

## 2025-04-12 NOTE — ED PROVIDER NOTES
Medication List as of 4/4/2025  4:14 PM        START taking these medications    Details   pseudoephedrine (SUDAFED SINUS CONGESTION 12HR) 120 MG extended release tablet Take 1 tablet by mouth in the morning and 1 tablet in the evening. Do all this for 10 days., Disp-20 tablet, R-0Print      fluticasone (FLONASE) 50 MCG/ACT nasal spray 1 spray by Each Nostril route daily, Disp-32 g, R-0Print           (Please note that portions of this note may have been completed with a voice recognition program. Efforts were made to edit the dictations but occasionally words are mis-transcribed.)    DO Linda Corea Jenny L, DO  04/11/25 2027